# Patient Record
Sex: MALE | Race: WHITE | Employment: OTHER | URBAN - METROPOLITAN AREA
[De-identification: names, ages, dates, MRNs, and addresses within clinical notes are randomized per-mention and may not be internally consistent; named-entity substitution may affect disease eponyms.]

---

## 2017-03-10 PROBLEM — K21.9 GASTROESOPHAGEAL REFLUX DISEASE: Status: ACTIVE | Noted: 2017-03-10

## 2018-09-08 ENCOUNTER — HOSPITAL ENCOUNTER (OUTPATIENT)
Age: 78
Discharge: HOME | End: 2018-09-10
Payer: MEDICARE

## 2018-09-08 DIAGNOSIS — E55.9 VITAMIN D DEFICIENCY: ICD-10-CM

## 2018-09-08 DIAGNOSIS — E78.01 FAMILIAL HYPERCHOLESTEROLEMIA: ICD-10-CM

## 2018-09-08 DIAGNOSIS — D50.0 IRON DEFICIENCY ANEMIA SECONDARY TO BLOOD LOSS (CHRONIC): ICD-10-CM

## 2018-09-08 DIAGNOSIS — R73.9 HYPERGLYCEMIA: ICD-10-CM

## 2018-09-08 DIAGNOSIS — E03.9 PRIMARY HYPOTHYROIDISM: ICD-10-CM

## 2018-09-08 LAB
ALBUMIN SERPL-MCNC: 4.3 G/DL (ref 3.5–5.2)
ALP BLD-CCNC: 78 U/L (ref 40–129)
ALT SERPL-CCNC: 14 U/L (ref 0–40)
ANION GAP SERPL CALCULATED.3IONS-SCNC: 10 MMOL/L (ref 7–16)
AST SERPL-CCNC: 16 U/L (ref 0–39)
BILIRUB SERPL-MCNC: 0.5 MG/DL (ref 0–1.2)
BUN BLDV-MCNC: 14 MG/DL (ref 8–23)
CALCIUM SERPL-MCNC: 9.1 MG/DL (ref 8.6–10.2)
CHLORIDE BLD-SCNC: 105 MMOL/L (ref 98–107)
CHOLESTEROL, TOTAL: 170 MG/DL (ref 0–199)
CO2: 24 MMOL/L (ref 22–29)
CREAT SERPL-MCNC: 0.9 MG/DL (ref 0.7–1.2)
GFR AFRICAN AMERICAN: >60
GFR NON-AFRICAN AMERICAN: >60 ML/MIN/1.73
GLUCOSE BLD-MCNC: 97 MG/DL (ref 74–109)
HBA1C MFR BLD: 5.5 % (ref 4–5.6)
HCT VFR BLD CALC: 48.5 % (ref 37–54)
HDLC SERPL-MCNC: 52 MG/DL
HEMOGLOBIN: 15.7 G/DL (ref 12.5–16.5)
LDL CHOLESTEROL CALCULATED: 87 MG/DL (ref 0–99)
MCH RBC QN AUTO: 31.5 PG (ref 26–35)
MCHC RBC AUTO-ENTMCNC: 32.4 % (ref 32–34.5)
MCV RBC AUTO: 97.2 FL (ref 80–99.9)
PDW BLD-RTO: 13.1 FL (ref 11.5–15)
PLATELET # BLD: 169 E9/L (ref 130–450)
PMV BLD AUTO: 10.4 FL (ref 7–12)
POTASSIUM SERPL-SCNC: 4.3 MMOL/L (ref 3.5–5)
RBC # BLD: 4.99 E12/L (ref 3.8–5.8)
SODIUM BLD-SCNC: 139 MMOL/L (ref 132–146)
TOTAL PROTEIN: 7.4 G/DL (ref 6.4–8.3)
TRIGL SERPL-MCNC: 156 MG/DL (ref 0–149)
TSH SERPL DL<=0.05 MIU/L-ACNC: 0.64 UIU/ML (ref 0.27–4.2)
VITAMIN D 25-HYDROXY: 32 NG/ML (ref 30–100)
VLDLC SERPL CALC-MCNC: 31 MG/DL
WBC # BLD: 5.1 E9/L (ref 4.5–11.5)

## 2018-09-08 PROCEDURE — 82306 VITAMIN D 25 HYDROXY: CPT

## 2018-09-08 PROCEDURE — 85027 COMPLETE CBC AUTOMATED: CPT

## 2018-09-08 PROCEDURE — 80053 COMPREHEN METABOLIC PANEL: CPT

## 2018-09-08 PROCEDURE — 80061 LIPID PANEL: CPT

## 2018-09-08 PROCEDURE — 83036 HEMOGLOBIN GLYCOSYLATED A1C: CPT

## 2018-09-08 PROCEDURE — 84443 ASSAY THYROID STIM HORMONE: CPT

## 2019-09-12 ENCOUNTER — HOSPITAL ENCOUNTER (OUTPATIENT)
Age: 79
Discharge: HOME | End: 2019-09-14
Payer: MEDICARE

## 2019-09-12 DIAGNOSIS — E55.9 VITAMIN D DEFICIENCY: ICD-10-CM

## 2019-09-12 DIAGNOSIS — E78.01 FAMILIAL HYPERCHOLESTEROLEMIA: ICD-10-CM

## 2019-09-12 DIAGNOSIS — Z12.5 SPECIAL SCREENING FOR MALIGNANT NEOPLASM OF PROSTATE: ICD-10-CM

## 2019-09-12 DIAGNOSIS — E03.9 HYPOTHYROIDISM, UNSPECIFIED TYPE: ICD-10-CM

## 2019-09-12 DIAGNOSIS — R73.9 HYPERGLYCEMIA: ICD-10-CM

## 2019-09-12 DIAGNOSIS — D50.0 IRON DEFICIENCY ANEMIA SECONDARY TO BLOOD LOSS (CHRONIC): ICD-10-CM

## 2019-09-12 LAB
ALBUMIN SERPL-MCNC: 4.5 G/DL (ref 3.5–5.2)
ALP BLD-CCNC: 79 U/L (ref 40–129)
ALT SERPL-CCNC: 14 U/L (ref 0–40)
ANION GAP SERPL CALCULATED.3IONS-SCNC: 14 MMOL/L (ref 7–16)
AST SERPL-CCNC: 17 U/L (ref 0–39)
BILIRUB SERPL-MCNC: 0.5 MG/DL (ref 0–1.2)
BUN BLDV-MCNC: 14 MG/DL (ref 8–23)
CALCIUM SERPL-MCNC: 9.4 MG/DL (ref 8.6–10.2)
CHLORIDE BLD-SCNC: 102 MMOL/L (ref 98–107)
CHOLESTEROL, TOTAL: 194 MG/DL (ref 0–199)
CO2: 25 MMOL/L (ref 22–29)
CREAT SERPL-MCNC: 1 MG/DL (ref 0.7–1.2)
GFR AFRICAN AMERICAN: >60
GFR NON-AFRICAN AMERICAN: >60 ML/MIN/1.73
GLUCOSE BLD-MCNC: 101 MG/DL (ref 74–99)
HBA1C MFR BLD: 5.6 % (ref 4–5.6)
HCT VFR BLD CALC: 50.8 % (ref 37–54)
HDLC SERPL-MCNC: 52 MG/DL
HEMOGLOBIN: 16.3 G/DL (ref 12.5–16.5)
LDL CHOLESTEROL CALCULATED: 121 MG/DL (ref 0–99)
MCH RBC QN AUTO: 32.2 PG (ref 26–35)
MCHC RBC AUTO-ENTMCNC: 32.1 % (ref 32–34.5)
MCV RBC AUTO: 100.4 FL (ref 80–99.9)
PDW BLD-RTO: 13.2 FL (ref 11.5–15)
PLATELET # BLD: 178 E9/L (ref 130–450)
PMV BLD AUTO: 9.8 FL (ref 7–12)
POTASSIUM SERPL-SCNC: 4.1 MMOL/L (ref 3.5–5)
PROSTATE SPECIFIC ANTIGEN: 1.35 NG/ML (ref 0–4)
RBC # BLD: 5.06 E12/L (ref 3.8–5.8)
SODIUM BLD-SCNC: 141 MMOL/L (ref 132–146)
TOTAL PROTEIN: 7.7 G/DL (ref 6.4–8.3)
TRIGL SERPL-MCNC: 107 MG/DL (ref 0–149)
TSH SERPL DL<=0.05 MIU/L-ACNC: 1.36 UIU/ML (ref 0.27–4.2)
VLDLC SERPL CALC-MCNC: 21 MG/DL
WBC # BLD: 5 E9/L (ref 4.5–11.5)

## 2019-09-12 PROCEDURE — 80061 LIPID PANEL: CPT

## 2019-09-12 PROCEDURE — 84443 ASSAY THYROID STIM HORMONE: CPT

## 2019-09-12 PROCEDURE — G0103 PSA SCREENING: HCPCS

## 2019-09-12 PROCEDURE — 83036 HEMOGLOBIN GLYCOSYLATED A1C: CPT

## 2019-09-12 PROCEDURE — 36415 COLL VENOUS BLD VENIPUNCTURE: CPT

## 2019-09-12 PROCEDURE — 85027 COMPLETE CBC AUTOMATED: CPT

## 2019-09-12 PROCEDURE — 80053 COMPREHEN METABOLIC PANEL: CPT

## 2020-02-21 PROBLEM — F01.53 VASCULAR DEMENTIA WITH DEPRESSED MOOD: Status: ACTIVE | Noted: 2020-02-21

## 2020-03-16 ENCOUNTER — APPOINTMENT (OUTPATIENT)
Dept: CT IMAGING | Age: 80
End: 2020-03-16
Payer: MEDICARE

## 2020-03-16 ENCOUNTER — HOSPITAL ENCOUNTER (EMERGENCY)
Age: 80
Discharge: HOME | End: 2020-03-16
Attending: EMERGENCY MEDICINE
Payer: MEDICARE

## 2020-03-16 VITALS
WEIGHT: 205 LBS | BODY MASS INDEX: 31.07 KG/M2 | SYSTOLIC BLOOD PRESSURE: 93 MMHG | TEMPERATURE: 97.7 F | OXYGEN SATURATION: 96 % | HEIGHT: 68 IN | HEART RATE: 64 BPM | RESPIRATION RATE: 20 BRPM | DIASTOLIC BLOOD PRESSURE: 46 MMHG

## 2020-03-16 LAB
ALBUMIN SERPL-MCNC: 3.8 G/DL (ref 3.5–5.2)
ALP BLD-CCNC: 68 U/L (ref 40–129)
ALT SERPL-CCNC: 24 U/L (ref 0–40)
ANION GAP SERPL CALCULATED.3IONS-SCNC: 12 MMOL/L (ref 7–16)
AST SERPL-CCNC: 28 U/L (ref 0–39)
BACTERIA: ABNORMAL /HPF
BASOPHILS ABSOLUTE: 0 E9/L (ref 0–0.2)
BASOPHILS RELATIVE PERCENT: 0.1 % (ref 0–2)
BILIRUB SERPL-MCNC: 0.5 MG/DL (ref 0–1.2)
BILIRUBIN URINE: NEGATIVE
BLOOD, URINE: ABNORMAL
BUN BLDV-MCNC: 23 MG/DL (ref 8–23)
CALCIUM SERPL-MCNC: 8.6 MG/DL (ref 8.6–10.2)
CHLORIDE BLD-SCNC: 104 MMOL/L (ref 98–107)
CLARITY: CLEAR
CO2: 19 MMOL/L (ref 22–29)
COLOR: YELLOW
CREAT SERPL-MCNC: 1 MG/DL (ref 0.7–1.2)
CRYSTALS, UA: ABNORMAL /HPF
EOSINOPHILS ABSOLUTE: 0 E9/L (ref 0.05–0.5)
EOSINOPHILS RELATIVE PERCENT: 0.3 % (ref 0–6)
EPITHELIAL CELLS, UA: ABNORMAL /HPF
GFR AFRICAN AMERICAN: >60
GFR NON-AFRICAN AMERICAN: >60 ML/MIN/1.73
GLUCOSE BLD-MCNC: 110 MG/DL (ref 74–99)
GLUCOSE URINE: NEGATIVE MG/DL
HCT VFR BLD CALC: 49.8 % (ref 37–54)
HEMOGLOBIN: 16.7 G/DL (ref 12.5–16.5)
KETONES, URINE: NEGATIVE MG/DL
LACTIC ACID, SEPSIS: 1.1 MMOL/L (ref 0.5–1.9)
LEUKOCYTE ESTERASE, URINE: NEGATIVE
LIPASE: 24 U/L (ref 13–60)
LYMPHOCYTES ABSOLUTE: 0.54 E9/L (ref 1.5–4)
LYMPHOCYTES RELATIVE PERCENT: 8 % (ref 20–42)
MCH RBC QN AUTO: 32.9 PG (ref 26–35)
MCHC RBC AUTO-ENTMCNC: 33.5 % (ref 32–34.5)
MCV RBC AUTO: 98.2 FL (ref 80–99.9)
MONOCYTES ABSOLUTE: 0.2 E9/L (ref 0.1–0.95)
MONOCYTES RELATIVE PERCENT: 2.7 % (ref 2–12)
MUCUS: PRESENT /LPF
NEUTROPHILS ABSOLUTE: 6.05 E9/L (ref 1.8–7.3)
NEUTROPHILS RELATIVE PERCENT: 89.4 % (ref 43–80)
NITRITE, URINE: NEGATIVE
OVALOCYTES: ABNORMAL
PDW BLD-RTO: 13.2 FL (ref 11.5–15)
PH UA: 5 (ref 5–9)
PLATELET # BLD: 149 E9/L (ref 130–450)
PMV BLD AUTO: 9.5 FL (ref 7–12)
POIKILOCYTES: ABNORMAL
POTASSIUM SERPL-SCNC: 4.2 MMOL/L (ref 3.5–5)
PROTEIN UA: NEGATIVE MG/DL
RBC # BLD: 5.07 E12/L (ref 3.8–5.8)
RBC UA: ABNORMAL /HPF (ref 0–2)
SODIUM BLD-SCNC: 135 MMOL/L (ref 132–146)
SPECIFIC GRAVITY UA: 1.01 (ref 1–1.03)
TOTAL PROTEIN: 6.8 G/DL (ref 6.4–8.3)
UROBILINOGEN, URINE: 0.2 E.U./DL
WBC # BLD: 6.8 E9/L (ref 4.5–11.5)
WBC UA: ABNORMAL /HPF (ref 0–5)

## 2020-03-16 PROCEDURE — 6360000004 HC RX CONTRAST MEDICATION: Performed by: RADIOLOGY

## 2020-03-16 PROCEDURE — 2580000003 HC RX 258: Performed by: STUDENT IN AN ORGANIZED HEALTH CARE EDUCATION/TRAINING PROGRAM

## 2020-03-16 PROCEDURE — 6360000002 HC RX W HCPCS: Performed by: STUDENT IN AN ORGANIZED HEALTH CARE EDUCATION/TRAINING PROGRAM

## 2020-03-16 PROCEDURE — 96374 THER/PROPH/DIAG INJ IV PUSH: CPT

## 2020-03-16 PROCEDURE — 85025 COMPLETE CBC W/AUTO DIFF WBC: CPT

## 2020-03-16 PROCEDURE — 81001 URINALYSIS AUTO W/SCOPE: CPT

## 2020-03-16 PROCEDURE — 80053 COMPREHEN METABOLIC PANEL: CPT

## 2020-03-16 PROCEDURE — 83605 ASSAY OF LACTIC ACID: CPT

## 2020-03-16 PROCEDURE — 74177 CT ABD & PELVIS W/CONTRAST: CPT

## 2020-03-16 PROCEDURE — 99284 EMERGENCY DEPT VISIT MOD MDM: CPT

## 2020-03-16 PROCEDURE — 83690 ASSAY OF LIPASE: CPT

## 2020-03-16 RX ORDER — 0.9 % SODIUM CHLORIDE 0.9 %
1000 INTRAVENOUS SOLUTION INTRAVENOUS ONCE
Status: COMPLETED | OUTPATIENT
Start: 2020-03-16 | End: 2020-03-16

## 2020-03-16 RX ORDER — CALCIUM CARBONATE 200(500)MG
2 TABLET,CHEWABLE ORAL 4 TIMES DAILY PRN
COMMUNITY
End: 2020-11-20

## 2020-03-16 RX ORDER — LEVOTHYROXINE SODIUM 0.15 MG/1
150 TABLET ORAL DAILY
COMMUNITY
End: 2020-03-23 | Stop reason: SDUPTHER

## 2020-03-16 RX ORDER — DILTIAZEM HYDROCHLORIDE 120 MG/1
120 CAPSULE, COATED, EXTENDED RELEASE ORAL DAILY
COMMUNITY
End: 2020-05-26 | Stop reason: SDUPTHER

## 2020-03-16 RX ORDER — ONDANSETRON 2 MG/ML
4 INJECTION INTRAMUSCULAR; INTRAVENOUS ONCE
Status: COMPLETED | OUTPATIENT
Start: 2020-03-16 | End: 2020-03-16

## 2020-03-16 RX ORDER — VERAPAMIL HYDROCHLORIDE 120 MG/1
120 TABLET, FILM COATED ORAL 3 TIMES DAILY
COMMUNITY
End: 2020-03-16 | Stop reason: ALTCHOICE

## 2020-03-16 RX ADMIN — ONDANSETRON 4 MG: 2 INJECTION INTRAMUSCULAR; INTRAVENOUS at 10:37

## 2020-03-16 RX ADMIN — IOPAMIDOL 80 ML: 755 INJECTION, SOLUTION INTRAVENOUS at 11:43

## 2020-03-16 RX ADMIN — SODIUM CHLORIDE 1000 ML: 9 INJECTION, SOLUTION INTRAVENOUS at 10:37

## 2020-03-16 RX ADMIN — SODIUM CHLORIDE 1000 ML: 9 INJECTION, SOLUTION INTRAVENOUS at 13:45

## 2020-03-16 ASSESSMENT — ENCOUNTER SYMPTOMS
SHORTNESS OF BREATH: 0
BACK PAIN: 0
DIARRHEA: 1
EYE REDNESS: 0
COUGH: 0
SORE THROAT: 0
EYE DISCHARGE: 0
VOMITING: 0
NAUSEA: 1
SINUS PRESSURE: 0
ABDOMINAL PAIN: 1
WHEEZING: 0
EYE PAIN: 0

## 2020-03-16 NOTE — ED PROVIDER NOTES
The patient is a 70-year-old male who presents the emergency department valuated for abdominal pain and diarrhea that is been ongoing for the past 4 to 5 days. Patient states that 3 weeks ago he was on an antibiotic after a insect bite to his right hand. He does not remember what antibiotic he was on. He denies any fevers or chills. He states the diarrhea began some brown color but is developed into a watery, clear, malodorous diarrhea. He still eating and drinking normally without vomiting. There is intermittent nausea. He has no pain at this time. The pain is described as a dull, aching sensation in the lower abdomen that occasionally radiates into the right upper abdomen and right flank. He denies any hematuria or dysuria. There is no migration of the pain into the groin of the legs. He has no chest pain or shortness of breath. He is not taking any medications at home for the symptoms. The history is provided by the patient. Review of Systems   Constitutional: Negative for chills and fever. HENT: Negative for ear pain, sinus pressure and sore throat. Eyes: Negative for pain, discharge and redness. Respiratory: Negative for cough, shortness of breath and wheezing. Cardiovascular: Negative for chest pain. Gastrointestinal: Positive for abdominal pain, diarrhea and nausea. Negative for vomiting. Genitourinary: Negative for dysuria and frequency. Musculoskeletal: Negative for arthralgias and back pain. Skin: Negative for rash and wound. Neurological: Negative for weakness and headaches. Hematological: Negative for adenopathy. All other systems reviewed and are negative. Physical Exam  Vitals signs and nursing note reviewed. Constitutional:       General: He is not in acute distress. Appearance: Normal appearance. He is normal weight. He is not ill-appearing, toxic-appearing or diaphoretic. HENT:      Mouth/Throat:      Mouth: Mucous membranes are dry. Cardiovascular:      Rate and Rhythm: Normal rate and regular rhythm. Pulses: Normal pulses. Heart sounds: Normal heart sounds. No murmur. Pulmonary:      Effort: Pulmonary effort is normal. No respiratory distress. Breath sounds: Normal breath sounds. No wheezing, rhonchi or rales. Abdominal:      General: Abdomen is flat. Palpations: Abdomen is soft. Tenderness: There is no abdominal tenderness. Negative signs include Alvarado's sign and Rovsing's sign. Hernia: No hernia is present. Neurological:      General: No focal deficit present. Mental Status: He is alert and oriented to person, place, and time. Motor: No weakness. Coordination: Coordination normal.          Procedures     MDM  Number of Diagnoses or Management Options  Diarrhea, unspecified type:   Diagnosis management comments: Benign abdominal examination. No evidence of an acute infectious or inflammatory process on the CT abdomen pelvis. No evidence of vascular abnormality. Supportive care provided here in the department. Patient discharged home in stable condition. Return instructions were discussed at length. ED Course as of Mar 16 1430   Mon Mar 16, 2020   1337   ATTENDING PROVIDER ATTESTATION:     I have personally performed and/or participated in the history, exam, medical decision making, and procedures and agree with all pertinent clinical information unless otherwise noted. I have also reviewed and agree with the past medical, family and social history unless otherwise noted. I have discussed this patient in detail with the resident and provided the instruction and education regarding the evidence-based evaluation and treatment of [unfilled]  History: patient presents with complaint of diarrhea. My findings: Meron Berumen is a 78 y.o. male whom is in no distress. Physical exam reveals well appearing male. Dry mucous membranes.  Abdomen with (+) BS, soft and Endoscopy, colon, diagnostic; ECHO Compl W Dop Color Flow (3/18/2013); Colonoscopy (12/13/2012); and eye surgery. Social History:  reports that he has never smoked. He has never used smokeless tobacco. He reports that he does not drink alcohol or use drugs. Family History: family history includes Diabetes in his mother; Hearing Loss in his brother. The patients home medications have been reviewed. Allergies: Patient has no known allergies.     -------------------------------------------------- RESULTS -------------------------------------------------  Labs:  Results for orders placed or performed during the hospital encounter of 03/16/20   CBC auto differential   Result Value Ref Range    WBC 6.8 4.5 - 11.5 E9/L    RBC 5.07 3.80 - 5.80 E12/L    Hemoglobin 16.7 (H) 12.5 - 16.5 g/dL    Hematocrit 49.8 37.0 - 54.0 %    MCV 98.2 80.0 - 99.9 fL    MCH 32.9 26.0 - 35.0 pg    MCHC 33.5 32.0 - 34.5 %    RDW 13.2 11.5 - 15.0 fL    Platelets 312 635 - 646 E9/L    MPV 9.5 7.0 - 12.0 fL    Neutrophils % 89.4 (H) 43.0 - 80.0 %    Lymphocytes % 8.0 (L) 20.0 - 42.0 %    Monocytes % 2.7 2.0 - 12.0 %    Eosinophils % 0.3 0.0 - 6.0 %    Basophils % 0.1 0.0 - 2.0 %    Neutrophils Absolute 6.05 1.80 - 7.30 E9/L    Lymphocytes Absolute 0.54 (L) 1.50 - 4.00 E9/L    Monocytes Absolute 0.20 0.10 - 0.95 E9/L    Eosinophils Absolute 0.00 (L) 0.05 - 0.50 E9/L    Basophils Absolute 0.00 0.00 - 0.20 E9/L    Poikilocytes 1+     Ovalocytes 1+    Comprehensive Metabolic Panel   Result Value Ref Range    Sodium 135 132 - 146 mmol/L    Potassium 4.2 3.5 - 5.0 mmol/L    Chloride 104 98 - 107 mmol/L    CO2 19 (L) 22 - 29 mmol/L    Anion Gap 12 7 - 16 mmol/L    Glucose 110 (H) 74 - 99 mg/dL    BUN 23 8 - 23 mg/dL    CREATININE 1.0 0.7 - 1.2 mg/dL    GFR Non-African American >60 >=60 mL/min/1.73    GFR African American >60     Calcium 8.6 8.6 - 10.2 mg/dL    Total Protein 6.8 6.4 - 8.3 g/dL    Alb 3.8 3.5 - 5.2 g/dL    Total Bilirubin 0.5

## 2020-11-11 DIAGNOSIS — Z12.5 SPECIAL SCREENING FOR MALIGNANT NEOPLASM OF PROSTATE: ICD-10-CM

## 2020-11-11 DIAGNOSIS — D50.0 IRON DEFICIENCY ANEMIA SECONDARY TO BLOOD LOSS (CHRONIC): ICD-10-CM

## 2020-11-11 DIAGNOSIS — E55.9 VITAMIN D DEFICIENCY: ICD-10-CM

## 2020-11-11 DIAGNOSIS — E03.9 HYPOTHYROIDISM, UNSPECIFIED TYPE: ICD-10-CM

## 2020-11-11 DIAGNOSIS — R73.9 HYPERGLYCEMIA: ICD-10-CM

## 2020-11-11 DIAGNOSIS — E78.01 FAMILIAL HYPERCHOLESTEROLEMIA: ICD-10-CM

## 2020-11-11 LAB
ALBUMIN SERPL-MCNC: 4.4 G/DL (ref 3.5–5.2)
ALP BLD-CCNC: 77 U/L (ref 40–129)
ALT SERPL-CCNC: 14 U/L (ref 0–40)
ANION GAP SERPL CALCULATED.3IONS-SCNC: 15 MMOL/L (ref 7–16)
AST SERPL-CCNC: 18 U/L (ref 0–39)
BILIRUB SERPL-MCNC: 0.4 MG/DL (ref 0–1.2)
BUN BLDV-MCNC: 15 MG/DL (ref 8–23)
CALCIUM SERPL-MCNC: 9.3 MG/DL (ref 8.6–10.2)
CHLORIDE BLD-SCNC: 104 MMOL/L (ref 98–107)
CHOLESTEROL, TOTAL: 176 MG/DL (ref 0–199)
CO2: 21 MMOL/L (ref 22–29)
CREAT SERPL-MCNC: 0.9 MG/DL (ref 0.7–1.2)
GFR AFRICAN AMERICAN: >60
GFR NON-AFRICAN AMERICAN: >60 ML/MIN/1.73
GLUCOSE BLD-MCNC: 105 MG/DL (ref 74–99)
HBA1C MFR BLD: 5.6 % (ref 4–5.6)
HCT VFR BLD CALC: 51.2 % (ref 37–54)
HDLC SERPL-MCNC: 49 MG/DL
HEMOGLOBIN: 16.4 G/DL (ref 12.5–16.5)
LDL CHOLESTEROL CALCULATED: 106 MG/DL (ref 0–99)
MCH RBC QN AUTO: 31.5 PG (ref 26–35)
MCHC RBC AUTO-ENTMCNC: 32 % (ref 32–34.5)
MCV RBC AUTO: 98.5 FL (ref 80–99.9)
PDW BLD-RTO: 13 FL (ref 11.5–15)
PLATELET # BLD: 194 E9/L (ref 130–450)
PMV BLD AUTO: 10.3 FL (ref 7–12)
POTASSIUM SERPL-SCNC: 4.2 MMOL/L (ref 3.5–5)
PROSTATE SPECIFIC ANTIGEN: 1.34 NG/ML (ref 0–4)
RBC # BLD: 5.2 E12/L (ref 3.8–5.8)
SODIUM BLD-SCNC: 140 MMOL/L (ref 132–146)
TOTAL PROTEIN: 7.4 G/DL (ref 6.4–8.3)
TRIGL SERPL-MCNC: 105 MG/DL (ref 0–149)
TSH SERPL DL<=0.05 MIU/L-ACNC: 1.61 UIU/ML (ref 0.27–4.2)
VITAMIN D 25-HYDROXY: 40 NG/ML (ref 30–100)
VLDLC SERPL CALC-MCNC: 21 MG/DL
WBC # BLD: 5.8 E9/L (ref 4.5–11.5)

## 2021-11-22 DIAGNOSIS — E03.9 PRIMARY HYPOTHYROIDISM: ICD-10-CM

## 2021-11-22 DIAGNOSIS — D50.0 IRON DEFICIENCY ANEMIA SECONDARY TO BLOOD LOSS (CHRONIC): ICD-10-CM

## 2021-11-22 DIAGNOSIS — Z00.00 WELL ADULT EXAM: ICD-10-CM

## 2021-11-22 DIAGNOSIS — E78.01 FAMILIAL HYPERCHOLESTEROLEMIA: ICD-10-CM

## 2021-11-22 DIAGNOSIS — R73.9 HYPERGLYCEMIA: ICD-10-CM

## 2021-11-22 PROBLEM — F01.53 VASCULAR DEMENTIA WITH DEPRESSED MOOD: Status: RESOLVED | Noted: 2020-02-21 | Resolved: 2021-11-22

## 2021-11-22 LAB
ALBUMIN SERPL-MCNC: 4.3 G/DL (ref 3.5–5.2)
ALP BLD-CCNC: 81 U/L (ref 40–129)
ALT SERPL-CCNC: 14 U/L (ref 0–40)
ANION GAP SERPL CALCULATED.3IONS-SCNC: 14 MMOL/L (ref 7–16)
AST SERPL-CCNC: 16 U/L (ref 0–39)
BILIRUB SERPL-MCNC: 0.5 MG/DL (ref 0–1.2)
BUN BLDV-MCNC: 11 MG/DL (ref 6–23)
CALCIUM SERPL-MCNC: 9.1 MG/DL (ref 8.6–10.2)
CHLORIDE BLD-SCNC: 102 MMOL/L (ref 98–107)
CHOLESTEROL, TOTAL: 190 MG/DL (ref 0–199)
CO2: 24 MMOL/L (ref 22–29)
CREAT SERPL-MCNC: 0.8 MG/DL (ref 0.7–1.2)
GFR AFRICAN AMERICAN: >60
GFR NON-AFRICAN AMERICAN: >60 ML/MIN/1.73
GLUCOSE BLD-MCNC: 112 MG/DL (ref 74–99)
HBA1C MFR BLD: 5.4 % (ref 4–5.6)
HCT VFR BLD CALC: 50.6 % (ref 37–54)
HDLC SERPL-MCNC: 54 MG/DL
HEMOGLOBIN: 16.5 G/DL (ref 12.5–16.5)
LDL CHOLESTEROL CALCULATED: 115 MG/DL (ref 0–99)
MCH RBC QN AUTO: 32.5 PG (ref 26–35)
MCHC RBC AUTO-ENTMCNC: 32.6 % (ref 32–34.5)
MCV RBC AUTO: 99.6 FL (ref 80–99.9)
PDW BLD-RTO: 12.7 FL (ref 11.5–15)
PLATELET # BLD: 194 E9/L (ref 130–450)
PMV BLD AUTO: 9.8 FL (ref 7–12)
POTASSIUM SERPL-SCNC: 4.1 MMOL/L (ref 3.5–5)
RBC # BLD: 5.08 E12/L (ref 3.8–5.8)
SODIUM BLD-SCNC: 140 MMOL/L (ref 132–146)
TOTAL PROTEIN: 7.3 G/DL (ref 6.4–8.3)
TRIGL SERPL-MCNC: 107 MG/DL (ref 0–149)
TSH SERPL DL<=0.05 MIU/L-ACNC: 2.11 UIU/ML (ref 0.27–4.2)
VLDLC SERPL CALC-MCNC: 21 MG/DL
WBC # BLD: 5 E9/L (ref 4.5–11.5)

## 2022-02-26 ENCOUNTER — APPOINTMENT (OUTPATIENT)
Dept: GENERAL RADIOLOGY | Age: 82
DRG: 388 | End: 2022-02-26
Payer: MEDICARE

## 2022-02-26 ENCOUNTER — APPOINTMENT (OUTPATIENT)
Dept: CT IMAGING | Age: 82
DRG: 388 | End: 2022-02-26
Payer: MEDICARE

## 2022-02-26 ENCOUNTER — HOSPITAL ENCOUNTER (INPATIENT)
Age: 82
LOS: 3 days | Discharge: HOME | DRG: 388 | End: 2022-03-01
Attending: EMERGENCY MEDICINE | Admitting: INTERNAL MEDICINE
Payer: MEDICARE

## 2022-02-26 DIAGNOSIS — R07.9 CHEST PAIN, UNSPECIFIED TYPE: ICD-10-CM

## 2022-02-26 DIAGNOSIS — K56.609 SMALL BOWEL OBSTRUCTION (HCC): ICD-10-CM

## 2022-02-26 DIAGNOSIS — R55 SYNCOPE AND COLLAPSE: ICD-10-CM

## 2022-02-26 DIAGNOSIS — R10.84 GENERALIZED ABDOMINAL PAIN: Primary | ICD-10-CM

## 2022-02-26 LAB
ALBUMIN SERPL-MCNC: 4.3 G/DL (ref 3.5–5.2)
ALP BLD-CCNC: 85 U/L (ref 40–129)
ALT SERPL-CCNC: 17 U/L (ref 0–40)
ANION GAP SERPL CALCULATED.3IONS-SCNC: 14 MMOL/L (ref 7–16)
AST SERPL-CCNC: 20 U/L (ref 0–39)
BACTERIA: ABNORMAL /HPF
BASOPHILS ABSOLUTE: 0.01 E9/L (ref 0–0.2)
BASOPHILS RELATIVE PERCENT: 0.1 % (ref 0–2)
BILIRUB SERPL-MCNC: 0.7 MG/DL (ref 0–1.2)
BILIRUBIN DIRECT: <0.2 MG/DL (ref 0–0.3)
BILIRUBIN URINE: NEGATIVE
BILIRUBIN, INDIRECT: NORMAL MG/DL (ref 0–1)
BLOOD, URINE: NEGATIVE
BUN BLDV-MCNC: 22 MG/DL (ref 6–23)
CALCIUM SERPL-MCNC: 9.9 MG/DL (ref 8.6–10.2)
CHLORIDE BLD-SCNC: 94 MMOL/L (ref 98–107)
CLARITY: ABNORMAL
CO2: 25 MMOL/L (ref 22–29)
COLOR: ABNORMAL
CREAT SERPL-MCNC: 1.1 MG/DL (ref 0.7–1.2)
CRYSTALS, UA: ABNORMAL /HPF
EOSINOPHILS ABSOLUTE: 0.03 E9/L (ref 0.05–0.5)
EOSINOPHILS RELATIVE PERCENT: 0.3 % (ref 0–6)
EPITHELIAL CELLS, UA: ABNORMAL /HPF
GFR AFRICAN AMERICAN: >60
GFR NON-AFRICAN AMERICAN: >60 ML/MIN/1.73
GLUCOSE BLD-MCNC: 142 MG/DL (ref 74–99)
GLUCOSE URINE: NEGATIVE MG/DL
HCT VFR BLD CALC: 53.6 % (ref 37–54)
HEMOGLOBIN: 17.6 G/DL (ref 12.5–16.5)
IMMATURE GRANULOCYTES #: 0.02 E9/L
IMMATURE GRANULOCYTES %: 0.2 % (ref 0–5)
KETONES, URINE: 15 MG/DL
LEUKOCYTE ESTERASE, URINE: NEGATIVE
LIPASE: 11 U/L (ref 13–60)
LYMPHOCYTES ABSOLUTE: 0.79 E9/L (ref 1.5–4)
LYMPHOCYTES RELATIVE PERCENT: 9.1 % (ref 20–42)
MCH RBC QN AUTO: 32.1 PG (ref 26–35)
MCHC RBC AUTO-ENTMCNC: 32.8 % (ref 32–34.5)
MCV RBC AUTO: 97.8 FL (ref 80–99.9)
MONOCYTES ABSOLUTE: 0.63 E9/L (ref 0.1–0.95)
MONOCYTES RELATIVE PERCENT: 7.2 % (ref 2–12)
NEUTROPHILS ABSOLUTE: 7.22 E9/L (ref 1.8–7.3)
NEUTROPHILS RELATIVE PERCENT: 83.1 % (ref 43–80)
NITRITE, URINE: NEGATIVE
PDW BLD-RTO: 13 FL (ref 11.5–15)
PH UA: 5.5 (ref 5–9)
PLATELET # BLD: 182 E9/L (ref 130–450)
PMV BLD AUTO: 9.4 FL (ref 7–12)
POTASSIUM REFLEX MAGNESIUM: 3.9 MMOL/L (ref 3.5–5)
PRO-BNP: 67 PG/ML (ref 0–450)
PROTEIN UA: ABNORMAL MG/DL
RBC # BLD: 5.48 E12/L (ref 3.8–5.8)
RBC UA: ABNORMAL /HPF (ref 0–2)
SODIUM BLD-SCNC: 133 MMOL/L (ref 132–146)
SPECIFIC GRAVITY UA: 1.02 (ref 1–1.03)
TOTAL PROTEIN: 7.4 G/DL (ref 6.4–8.3)
TROPONIN, HIGH SENSITIVITY: 12 NG/L (ref 0–11)
TROPONIN, HIGH SENSITIVITY: 12 NG/L (ref 0–11)
UROBILINOGEN, URINE: 0.2 E.U./DL
WBC # BLD: 8.7 E9/L (ref 4.5–11.5)
WBC UA: ABNORMAL /HPF (ref 0–5)

## 2022-02-26 PROCEDURE — 93005 ELECTROCARDIOGRAM TRACING: CPT | Performed by: STUDENT IN AN ORGANIZED HEALTH CARE EDUCATION/TRAINING PROGRAM

## 2022-02-26 PROCEDURE — 71045 X-RAY EXAM CHEST 1 VIEW: CPT

## 2022-02-26 PROCEDURE — 74177 CT ABD & PELVIS W/CONTRAST: CPT

## 2022-02-26 PROCEDURE — 84484 ASSAY OF TROPONIN QUANT: CPT

## 2022-02-26 PROCEDURE — 87088 URINE BACTERIA CULTURE: CPT

## 2022-02-26 PROCEDURE — 83880 ASSAY OF NATRIURETIC PEPTIDE: CPT

## 2022-02-26 PROCEDURE — 80048 BASIC METABOLIC PNL TOTAL CA: CPT

## 2022-02-26 PROCEDURE — 85025 COMPLETE CBC W/AUTO DIFF WBC: CPT

## 2022-02-26 PROCEDURE — 36415 COLL VENOUS BLD VENIPUNCTURE: CPT

## 2022-02-26 PROCEDURE — 6360000002 HC RX W HCPCS: Performed by: STUDENT IN AN ORGANIZED HEALTH CARE EDUCATION/TRAINING PROGRAM

## 2022-02-26 PROCEDURE — 96374 THER/PROPH/DIAG INJ IV PUSH: CPT

## 2022-02-26 PROCEDURE — 99285 EMERGENCY DEPT VISIT HI MDM: CPT

## 2022-02-26 PROCEDURE — 81001 URINALYSIS AUTO W/SCOPE: CPT

## 2022-02-26 PROCEDURE — 83690 ASSAY OF LIPASE: CPT

## 2022-02-26 PROCEDURE — 6360000004 HC RX CONTRAST MEDICATION: Performed by: RADIOLOGY

## 2022-02-26 PROCEDURE — 6370000000 HC RX 637 (ALT 250 FOR IP): Performed by: STUDENT IN AN ORGANIZED HEALTH CARE EDUCATION/TRAINING PROGRAM

## 2022-02-26 PROCEDURE — 80076 HEPATIC FUNCTION PANEL: CPT

## 2022-02-26 PROCEDURE — 1200000000 HC SEMI PRIVATE

## 2022-02-26 PROCEDURE — 6370000000 HC RX 637 (ALT 250 FOR IP): Performed by: EMERGENCY MEDICINE

## 2022-02-26 RX ORDER — LIDOCAINE HYDROCHLORIDE 20 MG/ML
JELLY TOPICAL ONCE
Status: COMPLETED | OUTPATIENT
Start: 2022-02-26 | End: 2022-02-26

## 2022-02-26 RX ORDER — OXYMETAZOLINE HYDROCHLORIDE 0.05 G/100ML
2 SPRAY NASAL ONCE
Status: COMPLETED | OUTPATIENT
Start: 2022-02-26 | End: 2022-02-26

## 2022-02-26 RX ORDER — ONDANSETRON 2 MG/ML
4 INJECTION INTRAMUSCULAR; INTRAVENOUS ONCE
Status: COMPLETED | OUTPATIENT
Start: 2022-02-26 | End: 2022-02-26

## 2022-02-26 RX ADMIN — LIDOCAINE HYDROCHLORIDE: 20 JELLY TOPICAL at 23:40

## 2022-02-26 RX ADMIN — LIDOCAINE HYDROCHLORIDE: 20 SOLUTION ORAL; TOPICAL at 22:27

## 2022-02-26 RX ADMIN — ONDANSETRON 4 MG: 2 INJECTION INTRAMUSCULAR; INTRAVENOUS at 21:39

## 2022-02-26 RX ADMIN — IOPAMIDOL 75 ML: 755 INJECTION, SOLUTION INTRAVENOUS at 22:48

## 2022-02-26 RX ADMIN — NASAL DECONGESTANT 2 SPRAY: 0.05 SPRAY NASAL at 23:39

## 2022-02-26 ASSESSMENT — PAIN SCALES - GENERAL: PAINLEVEL_OUTOF10: 8

## 2022-02-26 ASSESSMENT — ENCOUNTER SYMPTOMS
ABDOMINAL PAIN: 1
WHEEZING: 0
EYE PAIN: 0
VOMITING: 0
DIARRHEA: 0
EYE DISCHARGE: 0
NAUSEA: 1
SINUS PRESSURE: 0
SORE THROAT: 0
BACK PAIN: 0
SHORTNESS OF BREATH: 0
COUGH: 0

## 2022-02-26 ASSESSMENT — PAIN DESCRIPTION - PAIN TYPE: TYPE: ACUTE PAIN

## 2022-02-26 ASSESSMENT — PAIN DESCRIPTION - LOCATION: LOCATION: ABDOMEN

## 2022-02-26 ASSESSMENT — PAIN - FUNCTIONAL ASSESSMENT: PAIN_FUNCTIONAL_ASSESSMENT: 0-10

## 2022-02-27 ENCOUNTER — APPOINTMENT (OUTPATIENT)
Dept: GENERAL RADIOLOGY | Age: 82
DRG: 388 | End: 2022-02-27
Payer: MEDICARE

## 2022-02-27 LAB
ALBUMIN SERPL-MCNC: 3.9 G/DL (ref 3.5–5.2)
ALP BLD-CCNC: 76 U/L (ref 40–129)
ALT SERPL-CCNC: 19 U/L (ref 0–40)
ANION GAP SERPL CALCULATED.3IONS-SCNC: 11 MMOL/L (ref 7–16)
AST SERPL-CCNC: 21 U/L (ref 0–39)
BASOPHILS ABSOLUTE: 0.03 E9/L (ref 0–0.2)
BASOPHILS RELATIVE PERCENT: 0.5 % (ref 0–2)
BILIRUB SERPL-MCNC: 0.6 MG/DL (ref 0–1.2)
BUN BLDV-MCNC: 21 MG/DL (ref 6–23)
CALCIUM SERPL-MCNC: 8.9 MG/DL (ref 8.6–10.2)
CHLORIDE BLD-SCNC: 103 MMOL/L (ref 98–107)
CO2: 23 MMOL/L (ref 22–29)
CORTISOL TOTAL: 14.15 MCG/DL (ref 2.68–18.4)
CREAT SERPL-MCNC: 0.9 MG/DL (ref 0.7–1.2)
EOSINOPHILS ABSOLUTE: 0.12 E9/L (ref 0.05–0.5)
EOSINOPHILS RELATIVE PERCENT: 1.9 % (ref 0–6)
GFR AFRICAN AMERICAN: >60
GFR NON-AFRICAN AMERICAN: >60 ML/MIN/1.73
GLUCOSE BLD-MCNC: 109 MG/DL (ref 74–99)
HCT VFR BLD CALC: 50.6 % (ref 37–54)
HEMOGLOBIN: 16.5 G/DL (ref 12.5–16.5)
IMMATURE GRANULOCYTES #: 0.01 E9/L
IMMATURE GRANULOCYTES %: 0.2 % (ref 0–5)
LYMPHOCYTES ABSOLUTE: 1.07 E9/L (ref 1.5–4)
LYMPHOCYTES RELATIVE PERCENT: 16.7 % (ref 20–42)
MAGNESIUM: 2 MG/DL (ref 1.6–2.6)
MCH RBC QN AUTO: 32 PG (ref 26–35)
MCHC RBC AUTO-ENTMCNC: 32.6 % (ref 32–34.5)
MCV RBC AUTO: 98.3 FL (ref 80–99.9)
METER GLUCOSE: 105 MG/DL (ref 74–99)
METER GLUCOSE: 106 MG/DL (ref 74–99)
METER GLUCOSE: 107 MG/DL (ref 74–99)
MONOCYTES ABSOLUTE: 0.61 E9/L (ref 0.1–0.95)
MONOCYTES RELATIVE PERCENT: 9.5 % (ref 2–12)
NEUTROPHILS ABSOLUTE: 4.58 E9/L (ref 1.8–7.3)
NEUTROPHILS RELATIVE PERCENT: 71.2 % (ref 43–80)
PDW BLD-RTO: 13 FL (ref 11.5–15)
PHOSPHORUS: 3.5 MG/DL (ref 2.5–4.5)
PLATELET # BLD: 167 E9/L (ref 130–450)
PMV BLD AUTO: 10.1 FL (ref 7–12)
POTASSIUM SERPL-SCNC: 3.8 MMOL/L (ref 3.5–5)
PROCALCITONIN: 0.1 NG/ML (ref 0–0.08)
RBC # BLD: 5.15 E12/L (ref 3.8–5.8)
SODIUM BLD-SCNC: 137 MMOL/L (ref 132–146)
T4 FREE: 1.06 NG/DL (ref 0.93–1.7)
TOTAL PROTEIN: 6.6 G/DL (ref 6.4–8.3)
TSH SERPL DL<=0.05 MIU/L-ACNC: 1.54 UIU/ML (ref 0.27–4.2)
WBC # BLD: 6.4 E9/L (ref 4.5–11.5)

## 2022-02-27 PROCEDURE — 84100 ASSAY OF PHOSPHORUS: CPT

## 2022-02-27 PROCEDURE — 82533 TOTAL CORTISOL: CPT

## 2022-02-27 PROCEDURE — 6360000002 HC RX W HCPCS: Performed by: INTERNAL MEDICINE

## 2022-02-27 PROCEDURE — 85025 COMPLETE CBC W/AUTO DIFF WBC: CPT

## 2022-02-27 PROCEDURE — 84443 ASSAY THYROID STIM HORMONE: CPT

## 2022-02-27 PROCEDURE — 82962 GLUCOSE BLOOD TEST: CPT

## 2022-02-27 PROCEDURE — C9113 INJ PANTOPRAZOLE SODIUM, VIA: HCPCS | Performed by: INTERNAL MEDICINE

## 2022-02-27 PROCEDURE — 71045 X-RAY EXAM CHEST 1 VIEW: CPT

## 2022-02-27 PROCEDURE — 83735 ASSAY OF MAGNESIUM: CPT

## 2022-02-27 PROCEDURE — 0D9670Z DRAINAGE OF STOMACH WITH DRAINAGE DEVICE, VIA NATURAL OR ARTIFICIAL OPENING: ICD-10-PCS | Performed by: INTERNAL MEDICINE

## 2022-02-27 PROCEDURE — 1200000000 HC SEMI PRIVATE

## 2022-02-27 PROCEDURE — 84439 ASSAY OF FREE THYROXINE: CPT

## 2022-02-27 PROCEDURE — 99222 1ST HOSP IP/OBS MODERATE 55: CPT | Performed by: SURGERY

## 2022-02-27 PROCEDURE — 84145 PROCALCITONIN (PCT): CPT

## 2022-02-27 PROCEDURE — 80053 COMPREHEN METABOLIC PANEL: CPT

## 2022-02-27 PROCEDURE — 2580000003 HC RX 258: Performed by: INTERNAL MEDICINE

## 2022-02-27 PROCEDURE — 6370000000 HC RX 637 (ALT 250 FOR IP): Performed by: INTERNAL MEDICINE

## 2022-02-27 PROCEDURE — 36415 COLL VENOUS BLD VENIPUNCTURE: CPT

## 2022-02-27 PROCEDURE — 99222 1ST HOSP IP/OBS MODERATE 55: CPT | Performed by: INTERNAL MEDICINE

## 2022-02-27 RX ORDER — POLYETHYLENE GLYCOL 3350 17 G/17G
17 POWDER, FOR SOLUTION ORAL DAILY PRN
Status: DISCONTINUED | OUTPATIENT
Start: 2022-02-27 | End: 2022-03-01 | Stop reason: HOSPADM

## 2022-02-27 RX ORDER — TAMSULOSIN HYDROCHLORIDE 0.4 MG/1
0.4 CAPSULE ORAL DAILY
Status: DISCONTINUED | OUTPATIENT
Start: 2022-02-28 | End: 2022-03-01 | Stop reason: HOSPADM

## 2022-02-27 RX ORDER — ONDANSETRON 2 MG/ML
4 INJECTION INTRAMUSCULAR; INTRAVENOUS EVERY 6 HOURS PRN
Status: DISCONTINUED | OUTPATIENT
Start: 2022-02-27 | End: 2022-02-27

## 2022-02-27 RX ORDER — SODIUM CHLORIDE 0.9 % (FLUSH) 0.9 %
5-40 SYRINGE (ML) INJECTION EVERY 12 HOURS SCHEDULED
Status: DISCONTINUED | OUTPATIENT
Start: 2022-02-27 | End: 2022-03-01 | Stop reason: HOSPADM

## 2022-02-27 RX ORDER — SODIUM CHLORIDE 9 MG/ML
25 INJECTION, SOLUTION INTRAVENOUS PRN
Status: DISCONTINUED | OUTPATIENT
Start: 2022-02-27 | End: 2022-03-01 | Stop reason: HOSPADM

## 2022-02-27 RX ORDER — 0.9 % SODIUM CHLORIDE 0.9 %
1000 INTRAVENOUS SOLUTION INTRAVENOUS ONCE
Status: COMPLETED | OUTPATIENT
Start: 2022-02-27 | End: 2022-02-27

## 2022-02-27 RX ORDER — DEXTROSE MONOHYDRATE 50 MG/ML
100 INJECTION, SOLUTION INTRAVENOUS PRN
Status: DISCONTINUED | OUTPATIENT
Start: 2022-02-27 | End: 2022-03-01 | Stop reason: HOSPADM

## 2022-02-27 RX ORDER — NICOTINE POLACRILEX 4 MG
15 LOZENGE BUCCAL PRN
Status: DISCONTINUED | OUTPATIENT
Start: 2022-02-27 | End: 2022-03-01 | Stop reason: HOSPADM

## 2022-02-27 RX ORDER — DILTIAZEM HYDROCHLORIDE 240 MG/1
240 CAPSULE, COATED, EXTENDED RELEASE ORAL DAILY
Status: DISCONTINUED | OUTPATIENT
Start: 2022-02-28 | End: 2022-03-01 | Stop reason: HOSPADM

## 2022-02-27 RX ORDER — SODIUM CHLORIDE 0.9 % (FLUSH) 0.9 %
5-40 SYRINGE (ML) INJECTION PRN
Status: DISCONTINUED | OUTPATIENT
Start: 2022-02-27 | End: 2022-03-01 | Stop reason: HOSPADM

## 2022-02-27 RX ORDER — ACETAMINOPHEN 325 MG/1
650 TABLET ORAL EVERY 6 HOURS PRN
Status: DISCONTINUED | OUTPATIENT
Start: 2022-02-27 | End: 2022-03-01 | Stop reason: HOSPADM

## 2022-02-27 RX ORDER — DEXTROSE MONOHYDRATE 25 G/50ML
12.5 INJECTION, SOLUTION INTRAVENOUS PRN
Status: DISCONTINUED | OUTPATIENT
Start: 2022-02-27 | End: 2022-02-27 | Stop reason: CLARIF

## 2022-02-27 RX ORDER — LEVOTHYROXINE SODIUM 0.15 MG/1
150 TABLET ORAL DAILY
Status: DISCONTINUED | OUTPATIENT
Start: 2022-02-27 | End: 2022-03-01 | Stop reason: HOSPADM

## 2022-02-27 RX ORDER — PROMETHAZINE HYDROCHLORIDE 25 MG/ML
6.25 INJECTION, SOLUTION INTRAMUSCULAR; INTRAVENOUS EVERY 6 HOURS PRN
Status: DISCONTINUED | OUTPATIENT
Start: 2022-02-27 | End: 2022-03-01 | Stop reason: HOSPADM

## 2022-02-27 RX ORDER — ACETAMINOPHEN 650 MG/1
650 SUPPOSITORY RECTAL EVERY 6 HOURS PRN
Status: DISCONTINUED | OUTPATIENT
Start: 2022-02-27 | End: 2022-03-01 | Stop reason: HOSPADM

## 2022-02-27 RX ORDER — DEXTROSE, SODIUM CHLORIDE, SODIUM LACTATE, POTASSIUM CHLORIDE, AND CALCIUM CHLORIDE 5; .6; .31; .03; .02 G/100ML; G/100ML; G/100ML; G/100ML; G/100ML
INJECTION, SOLUTION INTRAVENOUS CONTINUOUS
Status: DISCONTINUED | OUTPATIENT
Start: 2022-02-27 | End: 2022-03-01 | Stop reason: HOSPADM

## 2022-02-27 RX ORDER — SODIUM CHLORIDE 9 MG/ML
INJECTION, SOLUTION INTRAVENOUS CONTINUOUS
Status: DISCONTINUED | OUTPATIENT
Start: 2022-02-27 | End: 2022-02-27

## 2022-02-27 RX ORDER — ONDANSETRON 4 MG/1
4 TABLET, ORALLY DISINTEGRATING ORAL EVERY 8 HOURS PRN
Status: DISCONTINUED | OUTPATIENT
Start: 2022-02-27 | End: 2022-02-27

## 2022-02-27 RX ORDER — PANTOPRAZOLE SODIUM 40 MG/10ML
40 INJECTION, POWDER, LYOPHILIZED, FOR SOLUTION INTRAVENOUS DAILY
Status: DISCONTINUED | OUTPATIENT
Start: 2022-02-27 | End: 2022-03-01 | Stop reason: HOSPADM

## 2022-02-27 RX ADMIN — SODIUM CHLORIDE 1000 ML: 9 INJECTION, SOLUTION INTRAVENOUS at 02:31

## 2022-02-27 RX ADMIN — PANTOPRAZOLE SODIUM 40 MG: 40 INJECTION, POWDER, FOR SOLUTION INTRAVENOUS at 02:18

## 2022-02-27 RX ADMIN — SODIUM CHLORIDE, SODIUM LACTATE, POTASSIUM CHLORIDE, CALCIUM CHLORIDE AND DEXTROSE MONOHYDRATE: 5; 600; 310; 30; 20 INJECTION, SOLUTION INTRAVENOUS at 22:29

## 2022-02-27 RX ADMIN — LEVOTHYROXINE SODIUM 150 MCG: 0.15 TABLET ORAL at 06:27

## 2022-02-27 RX ADMIN — SODIUM CHLORIDE: 9 INJECTION, SOLUTION INTRAVENOUS at 06:27

## 2022-02-27 RX ADMIN — ENOXAPARIN SODIUM 40 MG: 100 INJECTION SUBCUTANEOUS at 08:14

## 2022-02-27 RX ADMIN — SODIUM CHLORIDE, SODIUM LACTATE, POTASSIUM CHLORIDE, CALCIUM CHLORIDE AND DEXTROSE MONOHYDRATE: 5; 600; 310; 30; 20 INJECTION, SOLUTION INTRAVENOUS at 11:27

## 2022-02-27 ASSESSMENT — PAIN SCALES - GENERAL
PAINLEVEL_OUTOF10: 0

## 2022-02-27 NOTE — CONSULTS
INPATIENT CARDIOLOGY CONSULT    Name: Omayra Musa    Age: 80 y.o. Date of Admission: 2/26/2022  9:00 PM    Date of Service: 2/27/2022    Reason for Consultation: Syncope    Chief Complaint   Patient presents with    Abdominal Pain     since yesterday morning         Referring Physician: Dash Warner DO    History of Present Illness: 60-year-old male with history of COPD, GERD, hiatal hernia, hypertension, hyperlipidemia, hypothyroidism, psoriasis, vascular dementia, history of paroxysmal SVT for which he has been asymptomatic as well as history of PVCs and right bundle branch block. He presented with abdominal complaints and found to have small bowel obstruction and what appeared to be vasovagal syncope. Cardiology consulted for further evaluation and management.             Past Medical History:  Past Medical History:   Diagnosis Date    Allergic rhinitis     DUE TO POLLEN     Anxiety     Arthritis     Chronic kidney disease     COPD (chronic obstructive pulmonary disease) (HCC)     GERD (gastroesophageal reflux disease)     Hiatal hernia     History of cardiovascular stress test 3-18-13    nuclear exercise    Hyperlipidemia     Hypertension     Hypothyroidism     Nontoxic (diffuse) goiter     Psoriasis     Vascular dementia with depressed mood ([facility] [address] 2/21/2020    Vascular dementia with depressed mood ([facility])        Review of Systems:   Cardiac: As per HPI  General: Denies fever or chills  Pulmonary: As per HPI  HEENT: Denies runny nose  GI: No complaints  : No complaints  Endocrine: Denies night sweats  Musculoskeletal: No complaints  Skin: Dry skin  Neuro: No complaints  Psych: Denies depression      Past Surgical History:  Past Surgical History:   Procedure Laterality Date    CHOLECYSTECTOMY      COLONOSCOPY  12/13/2012    ECHO COMPL W DOP COLOR FLOW  3/18/2013         ENDOSCOPY, COLON, DIAGNOSTIC      EYE SURGERY      CATARACT    TONSILLECTOMY         Family History:  Family Allergies    Home Medications:  Prior to Admission medications    Medication Sig Start Date End Date Taking?  Authorizing Provider   dilTIAZem (CARDIZEM CD) 240 MG extended release capsule Take 1 capsule by mouth daily 11/22/21  Yes Jack Arguello DO   levothyroxine (SYNTHROID) 150 MCG tablet Take 1 tablet by mouth Daily 11/22/21  Yes Jack Arguello DO   losartan (COZAAR) 100 MG tablet Take 1 tablet by mouth daily 11/22/21  Yes Jack Arguello DO   omeprazole (PRILOSEC) 40 MG delayed release capsule Take 1 capsule by mouth daily 11/22/21  Yes Jack Arguello, DO   tamsulosin [facility]) 0.4 MG capsule  9/7/21  Yes Historical Provider, MD   fluocinonide (LIDEX) 0.05 % external solution APPLY TO BUMPS ON SCALP TWICE DAILY AS NEEDED 6/3/21  Yes Historical Provider, MD   Multiple Vitamins-Minerals (CENTRUM SILVER ULTRA MENS) TABS Take 1 tablet by mouth daily   Yes Historical Provider, MD       Current Medications:  Current Facility-Administered Medications   Medication Dose Route Frequency Provider Last Rate Last Admin    glucose (GLUTOSE) 40 % oral gel 15 g  15 g Oral PRN Tez Olmedo DO        glucagon (rDNA) injection 1 mg  1 mg IntraMUSCular PRN Tez Olmedo DO        dextrose 5 % solution  100 mL/hr IntraVENous PRN Tez Olmedo DO        sodium chloride flush 0.9 % injection 5-40 mL  5-40 mL IntraVENous 2 times per day Tez Olmedo DO        sodium chloride flush 0.9 % injection 5-40 mL  5-40 mL IntraVENous PRN Tez Olmedo DO        0.9 % sodium chloride infusion  25 mL IntraVENous PRN Tez Olmedo DO        enoxaparin (LOVENOX) injection 40 mg  40 mg SubCUTAneous Daily Tez Olmedo DO   40 mg at 02/27/22 1392    polyethylene glycol (GLYCOLAX) packet 17 g  17 g Oral Daily PRN Tez Olmedo DO        acetaminophen (TYLENOL) tablet 650 mg  650 mg Oral Q6H PRN Tez Olmedo DO        Or    acetaminophen (TYLENOL) suppository 650 mg  650 mg Rectal Q6H PRN Chris Cox Guillermina, DO        dextrose bolus (hypoglycemia) 10% 125 mL  125 mL IntraVENous PRN Ivory Bryan, DO        Or    dextrose bolus (hypoglycemia) 10% 250 mL  250 mL IntraVENous PRN Ivory Bryan, DO        levothyroxine (SYNTHROID) tablet 150 mcg  150 mcg Oral Daily Ivory Bryan, DO   150 mcg at 02/27/22 3495    [START ON 2/28/2022] tamsulosin (FLOMAX) capsule 0.4 mg  0.4 mg Oral Daily Ivory Bryan, DO        promethazine (PHENERGAN) injection 6.25 mg  6.25 mg IntraMUSCular Q6H PRN Ivory Bryan, DO        pantoprazole (PROTONIX) injection 40 mg  40 mg IntraVENous Daily Ivory Bryan, DO   40 mg at 02/27/22 0218    dextrose 5 % in lactated ringers infusion   IntraVENous Continuous Cecilia Monas, DO 90 mL/hr at 02/27/22 1127 New Bag at 02/27/22 1127      dextrose      sodium chloride      dextrose 5% in lactated ringers 90 mL/hr at 02/27/22 1127       Physical Exam:  /80   Pulse 77   Temp 97.9 °F (36.6 °C) (Oral)   Resp 18   Ht 5' 8\" (1.727 m)   Wt 195 lb (88.5 kg)   SpO2 96%   BMI 29.65 kg/m²   Wt Readings from Last 3 Encounters:   02/27/22 195 lb (88.5 kg)   11/22/21 202 lb 1.6 oz (91.7 kg)   09/10/21 200 lb 4.8 oz (90.9 kg)       Appearance: Alert and oriented x3 not in acute distress.   Skin: Dry skin  Head: Atraumatic  Eyes: Intact extraocular muscles   ENMT: Mucous membranes are moist  Neck: Supple  Lungs: Clear to auscultation  Cardiac: Normal S1 and S2  Abdomen: Protuberant  Extremities: Intact range of motion  Neurologic: No focal neurological deficits  Peripheral Pulses: 2+ peripheral pulses    Intake/Output:    Intake/Output Summary (Last 24 hours) at 2/27/2022 1729  Last data filed at 2/27/2022 1551  Gross per 24 hour   Intake 2280.99 ml   Output 1350 ml   Net 930.99 ml     I/O this shift:  In: 2101 [I.V.:1101; IV Piggyback:1000]  Out: 100 [Emesis/NG output:100]    Laboratory Tests:  Recent Labs     02/26/22 2129 02/27/22  0503    137   K 3.9 3.8   CL 94* 103 CO2 25 23   BUN 22 21   CREATININE 1.1 0.9   GLUCOSE 142* 109*   CALCIUM 9.9 8.9     Lab Results   Component Value Date    MG 2.0 02/27/2022    MG 2.0 03/18/2013    MG 2.2 07/30/2012     Recent Labs     02/26/22 2129 02/27/22  0503   ALKPHOS 85 76   ALT 17 19   AST 20 21   PROT 7.4 6.6   BILITOT 0.7 0.6   BILIDIR <0.2  --    LABALBU 4.3 3.9     Recent Labs     02/26/22 2129 02/27/22  0503   WBC 8.7 6.4   RBC 5.48 5.15   HGB 17.6* 16.5   HCT 53.6 50.6   MCV 97.8 98.3   MCH 32.1 32.0   MCHC 32.8 32.6   RDW 13.0 13.0    167   MPV 9.4 10.1     Lab Results   Component Value Date    CKTOTAL 84 03/18/2013    CKMB 0.8 03/18/2013    TROPONINI <0.01 03/18/2013    TROPONINI <0.01 03/17/2013     Recent Labs     02/26/22 2129 02/26/22  2230   TROPHS 12* 12*     No results found for: INR, PROTIME  Lab Results   Component Value Date    TSH 1.540 02/27/2022    TSH 2.110 11/22/2021    TSH 1.610 11/11/2020     Lab Results   Component Value Date    LABA1C 5.4 11/22/2021    LABA1C 5.6 11/11/2020    LABA1C 5.6 09/12/2019     No results found for: EAG  Lab Results   Component Value Date    CHOL 190 11/22/2021    CHOL 176 11/11/2020    CHOL 194 09/12/2019     Lab Results   Component Value Date    TRIG 107 11/22/2021    TRIG 105 11/11/2020    TRIG 107 09/12/2019     Lab Results   Component Value Date    HDL 54 11/22/2021    HDL 49 11/11/2020    HDL 52 09/12/2019     Lab Results   Component Value Date    LDLCALC 115 (H) 11/22/2021    LDLCALC 106 (H) 11/11/2020    LDLCALC 121 (H) 09/12/2019     Lab Results   Component Value Date    LABVLDL 21 11/22/2021    LABVLDL 21 11/11/2020    LABVLDL 21 09/12/2019    VLDL 17 03/30/2018     Lab Results   Component Value Date    CHOLHDLRATIO 3.40 03/30/2018     Recent Labs     02/26/22  2129   PROBNP 67       Cardiac Tests:  EKG reviewed (EKG date: Sinus rhythm, 99 bpm, anterolateral Q waves and inferior Q waves and right bundle branch block):        Echocardiogram reviewed: March 2013  Summary    Left ventricular size and wall motion normal, EF 65%. Mild LVH. Stage I diastolic dysfunction. There is trace to mild tricuspid regurgitation. No evidence of pericardial effusion. Stress test reviewed: March 2013  IMPRESSION-     1. Unremarkable study.           Ejection fraction-       SPECT gated images of the left ventricular myocardium were obtained for   purposes of left ventricular ejection fraction determination.  Left   ventricular ejection fraction is calculated at 66%.         IMPRESSION-    1.  LVEF 66%.             Cardiac catheterization reviewed:     CXR reviewed: The ASCVD Risk score (Zaida Russell, et al., 2013) failed to calculate for the following reasons: The 2013 ASCVD risk score is only valid for ages 36 to 78    ASSESSMENT / PLAN:    1. Generalized abdominal pain  Management per primary and GI  Awaiting EGD  2. Small bowel obstruction (HCC)  Management per primary and GI  3. Syncope and collapse  Etiology likely vasovagal as wife report patient was on the toilet at the time this happened and in the same time he was having significant abdominal pain  Obtain updated echocardiogram to guide therapy  He has history of nonsustained SVT and occasional PVCs and will benefit from outpatient Zio patch heart monitor if no arrhythmias are found during hospitalization  4. Chest pain, unspecified type  Obtain echocardiogram to guide therapy  5. History of nonsustained SVT and occasional PVCs as well as right bundle branch block  Monitor on telemetry  5. COPD  6. GERD  7. Hiatal hernia  8. Hypertension  9. Hyperlipidemia  10. Hypothyroidism  11. Psoriasis  12. Vascular dementia        Thank you for allowing me to participate in your patient's care. Please feel free to contact me if you have any questions or concerns.     Sachi Lopez MD  [facility]) Cardiology

## 2022-02-27 NOTE — ED PROVIDER NOTES
80-year-old male presenting to the emergency department for nausea, diaphoresis, epigastric abdominal discomfort, has been ongoing since 330 this morning. His wife awoke to found him nauseous, and attempting a bowel movement in the toilet, was very diaphoretic, and then passed out. She has been attempting to treat his symptoms at home with Pepcid, Protonix, and Tums without Shushan Finger of his symptoms which is why he came to the emergency department. He denies any fevers, chills, chest pain, chest tightness, shortness of breath, cough, congestion. Onset of symptoms this morning, persistent, nothing makes them better, nothing make them worse, epigastric discomfort associated with constant nausea. Review of Systems   Constitutional: Positive for diaphoresis. Negative for chills and fever. HENT: Negative for ear pain, sinus pressure and sore throat. Eyes: Negative for pain and discharge. Respiratory: Negative for cough, shortness of breath and wheezing. Cardiovascular: Negative for chest pain. Gastrointestinal: Positive for abdominal pain and nausea. Negative for diarrhea and vomiting. Genitourinary: Negative for dysuria and frequency. Musculoskeletal: Negative for arthralgias and back pain. Skin: Negative for rash and wound. Neurological: Positive for syncope (Lost consciousness on the toilet per his wife). Negative for weakness and headaches. Hematological: Negative for adenopathy. All other systems reviewed and are negative. Physical Exam  Vitals and nursing note reviewed. Constitutional:       Appearance: He is well-developed. HENT:      Head: Normocephalic and atraumatic. Right Ear: External ear normal.      Left Ear: External ear normal.      Nose: Nose normal.      Mouth/Throat:      Mouth: Mucous membranes are moist.   Eyes:      Extraocular Movements: Extraocular movements intact.       Conjunctiva/sclera: Conjunctivae normal.      Pupils: Pupils are equal, round, and reactive to light. Cardiovascular:      Rate and Rhythm: Regular rhythm. Tachycardia present. Heart sounds: Normal heart sounds. No murmur heard. Pulmonary:      Effort: Pulmonary effort is normal. No respiratory distress. Breath sounds: Normal breath sounds. No wheezing or rales. Abdominal:      General: Bowel sounds are normal. There is no distension. Palpations: Abdomen is soft. Tenderness: There is abdominal tenderness (Epigastric tenderness). There is no guarding or rebound. Musculoskeletal:         General: No tenderness or deformity. Cervical back: Normal range of motion and neck supple. Skin:     General: Skin is warm and dry. Neurological:      Mental Status: He is alert and oriented to person, place, and time. Cranial Nerves: No cranial nerve deficit. Coordination: Coordination normal.          Procedures     MDM     ED Course as of 02/27/22 0127   Sat Feb 26, 2022 2114 ATTENDING PROVIDER ATTESTATION:     I have personally performed and/or participated in the history, exam, medical decision making, and procedures and agree with all pertinent clinical information unless otherwise noted. I have also reviewed and agree with the past medical, family and social history unless otherwise noted. I have discussed this patient in detail with the resident, and provided the instruction and education regarding patient here complaining of some mid abdominal, epigastric and sternal chest and abdominal aching or discomfort. No actual pain. Has had some nausea with it with mild retching but no vomiting. No diarrhea. No sweating or shortness of breath and no palpitations. Symptoms started yesterday about 2 AM.  No leg pain or swelling. No history of blood clots. Has already had his gallbladder removed. Has tried multiple antacids with no resolution. .  My findings/plan: Patient laying the bed resting comfortably no acute distress.   Heart rate regular, lungs are clear and equal.  Abdomen soft and nontender with no pulsatile mass. No CVA tenderness. No jaundice or icterus. Arms legs are neurovascular intact with no pretibial edema or calf pain. [NC]   2120 EKG: This EKG is signed by emergency department physician. Rate: 99  Rhythm: Normal sinus rhythm, low voltage QRS  Interpretation: non-specific EKG  Comparison: No previous EKG to compare to     [JG]   2222 Initial troponin XII, will check a delta [JG]   2337 Appears to have a small bowel obstruction, will place an NG, and admit, put out consult to Dr. Alia Porras as well as general surgery for the small bowel obstruction [JG]   [age]year-old male presenting emergency department for abdominal pain, nausea, vomiting, ongoing since 330 this morning [JG]      ED Course User Index  [JG] Kristopher Multani MD  [NC] Domi Chauhan,       80year-old male presenting emergency department for abdominal pain, nausea, vomiting, ongoing since 330 this morning. Had an episode of syncope only was on the toilet, initial story concerning for possible cardiac etiology, however EKG was relatively unremarkable for acute myocardial ischemia, and troponins were unremarkable, found to have a bowel obstruction on CT, NG was placed, surgery consulted, admitted to hospital given patient's history of syncope and cardiac disease medically. ED Course as of 02/27/22 0128   Sat Feb 26, 2022 2114 ATTENDING PROVIDER ATTESTATION:     I have personally performed and/or participated in the history, exam, medical decision making, and procedures and agree with all pertinent clinical information unless otherwise noted. I have also reviewed and agree with the past medical, family and social history unless otherwise noted.     I have discussed this patient in detail with the resident, and provided the instruction and education regarding patient here complaining of some mid abdominal, epigastric and sternal chest and abdominal aching or discomfort. No actual pain. Has had some nausea with it with mild retching but no vomiting. No diarrhea. No sweating or shortness of breath and no palpitations. Symptoms started yesterday about 2 AM.  No leg pain or swelling. No history of blood clots. Has already had his gallbladder removed. Has tried multiple antacids with no resolution. .  My findings/plan: Patient laying the bed resting comfortably no acute distress. Heart rate regular, lungs are clear and equal.  Abdomen soft and nontender with no pulsatile mass. No CVA tenderness. No jaundice or icterus. Arms legs are neurovascular intact with no pretibial edema or calf pain. [NC]   2120 EKG: This EKG is signed by emergency department physician. Rate: 99  Rhythm: Normal sinus rhythm, low voltage QRS  Interpretation: non-specific EKG  Comparison: No previous EKG to compare to     [JG]   2222 Initial troponin XII, will check a delta [JG]   2337 Appears to have a small bowel obstruction, will place an NG, and admit, put out consult to Dr. Maria A Harris as well as general surgery for the small bowel obstruction [JG]   [age]year-old male presenting emergency department for abdominal pain, nausea, vomiting, ongoing since 330 this morning [JG]      ED Course User Index  [JG] Aura Arreguin MD  [NC] Jorge Mejia, DO       --------------------------------------------- PAST HISTORY ---------------------------------------------  Past Medical History:  has a past medical history of Allergic rhinitis, Anxiety, Arthritis, Chronic kidney disease, COPD (chronic obstructive pulmonary disease) ([facility] [address] GERD (gastroesophageal reflux disease), Hiatal hernia, History of cardiovascular stress test, Hyperlipidemia, Hypertension, Hypothyroidism, Nontoxic (diffuse) goiter, Psoriasis, Vascular dementia with depressed mood ([facility] [address] and Vascular dementia with depressed mood ([facility] [address]     Past Surgical History:  has a past surgical history that includes Cholecystectomy; Tonsillectomy; Endoscopy, colon, diagnostic; ECHO Compl W Dop Color Flow (3/18/2013); Colonoscopy (12/13/2012); and eye surgery. Social History:  reports that he has never smoked. He has never used smokeless tobacco. He reports that he does not drink alcohol and does not use drugs. Family History: family history includes Diabetes in his mother; Hearing Loss in his brother. The patients home medications have been reviewed. Allergies: Patient has no known allergies.     -------------------------------------------------- RESULTS -------------------------------------------------    LABS:  Results for orders placed or performed during the hospital encounter of 02/26/22   CBC with Auto Differential   Result Value Ref Range    WBC 8.7 4.5 - 11.5 E9/L    RBC 5.48 3.80 - 5.80 E12/L    Hemoglobin 17.6 (H) 12.5 - 16.5 g/dL    Hematocrit 53.6 37.0 - 54.0 %    MCV 97.8 80.0 - 99.9 fL    MCH 32.1 26.0 - 35.0 pg    MCHC 32.8 32.0 - 34.5 %    RDW 13.0 11.5 - 15.0 fL    Platelets 727 147 - 183 E9/L    MPV 9.4 7.0 - 12.0 fL    Neutrophils % 83.1 (H) 43.0 - 80.0 %    Immature Granulocytes % 0.2 0.0 - 5.0 %    Lymphocytes % 9.1 (L) 20.0 - 42.0 %    Monocytes % 7.2 2.0 - 12.0 %    Eosinophils % 0.3 0.0 - 6.0 %    Basophils % 0.1 0.0 - 2.0 %    Neutrophils Absolute 7.22 1.80 - 7.30 E9/L    Immature Granulocytes # 0.02 E9/L    Lymphocytes Absolute 0.79 (L) 1.50 - 4.00 E9/L    Monocytes Absolute 0.63 0.10 - 0.95 E9/L    Eosinophils Absolute 0.03 (L) 0.05 - 0.50 E9/L    Basophils Absolute 0.01 0.00 - 0.20 M9/B   Basic Metabolic Panel w/ Reflex to MG   Result Value Ref Range    Sodium 133 132 - 146 mmol/L    Potassium reflex Magnesium 3.9 3.5 - 5.0 mmol/L    Chloride 94 (L) 98 - 107 mmol/L    CO2 25 22 - 29 mmol/L    Anion Gap 14 7 - 16 mmol/L    Glucose 142 (H) 74 - 99 mg/dL    BUN 22 6 - 23 mg/dL    CREATININE 1.1 0.7 - 1.2 mg/dL    GFR Non-African American >60 >=60 mL/min/1.73    GFR African American >60 Calcium 9.9 8.6 - 10.2 mg/dL   Hepatic Function Panel   Result Value Ref Range    Total Protein 7.4 6.4 - 8.3 g/dL    Albumin 4.3 3.5 - 5.2 g/dL    Alkaline Phosphatase 85 40 - 129 U/L    ALT 17 0 - 40 U/L    AST 20 0 - 39 U/L    Total Bilirubin 0.7 0.0 - 1.2 mg/dL    Bilirubin, Direct <0.2 0.0 - 0.3 mg/dL    Bilirubin, Indirect see below 0.0 - 1.0 mg/dL   Troponin   Result Value Ref Range    Troponin, High Sensitivity 12 (H) 0 - 11 ng/L   Brain Natriuretic Peptide   Result Value Ref Range    Pro-BNP 67 0 - 450 pg/mL   Urinalysis with Microscopic   Result Value Ref Range    Color, UA DKYELLOW Straw/Yellow    Clarity, UA SLCLOUDY Clear    Glucose, Ur Negative Negative mg/dL    Bilirubin Urine Negative Negative    Ketones, Urine 15 (A) Negative mg/dL    Specific Gravity, UA 1.025 1.005 - 1.030    Blood, Urine Negative Negative    pH, UA 5.5 5.0 - 9.0    Protein, UA TRACE Negative mg/dL    Urobilinogen, Urine 0.2 <2.0 E.U./dL    Nitrite, Urine Negative Negative    Leukocyte Esterase, Urine Negative Negative    WBC, UA 1-3 0 - 5 /HPF    RBC, UA NONE 0 - 2 /HPF    Epithelial Cells, UA RARE /HPF    Bacteria, UA RARE (A) None Seen /HPF    Crystals, UA Few (A) None Seen /HPF   Lipase   Result Value Ref Range    Lipase 11 (L) 13 - 60 U/L   Troponin   Result Value Ref Range    Troponin, High Sensitivity 12 (H) 0 - 11 ng/L   EKG 12 Lead   Result Value Ref Range    Ventricular Rate 99 BPM    Atrial Rate 99 BPM    P-R Interval 152 ms    QRS Duration 114 ms    Q-T Interval 354 ms    QTc Calculation (Bazett) 454 ms    P Axis 70 degrees    R Axis -95 degrees    T Axis 40 degrees       RADIOLOGY:  XR CHEST PORTABLE   Final Result   NG tube tip in the proximal body of the stomach. CT ABDOMEN PELVIS W IV CONTRAST Additional Contrast? None   Final Result   1. Dilated stomach and proximal to mid small bowel caliber transition   although not specifically localized.   There is also fluid in normal caliber   distal bowel and throughout colon. Findings could relate to bowel   obstruction versus enteritis/enterocolitis. 2. Fluid in distal esophagus likely reflects GE reflux. 3. Sigmoid diverticulosis. No acute diverticulitis seen. 4. Dissection of the SMA appears unchanged from 03/16/2020   5. Unchanged fat containing small inguinal hernias. 6. Coronary artery calcifications. 7.      XR CHEST PORTABLE   Final Result   No acute process. ------------------------- NURSING NOTES AND VITALS REVIEWED ---------------------------  Date / Time Roomed:  2/26/2022  9:00 PM  ED Bed Assignment:  [id number]    The nursing notes within the ED encounter and vital signs as below have been reviewed. Patient Vitals for the past 24 hrs:   BP Temp Temp src Pulse Resp SpO2 Height Weight   02/27/22 0105 -- -- -- -- -- -- 5' 8\" (1.727 m) 195 lb (88.5 kg)   02/27/22 0041 (!) 142/99 97.8 °F (36.6 °C) Oral 86 18 98 % -- --   02/26/22 2307 136/70 -- -- 95 16 97 % -- --   02/26/22 2100 -- 98.2 °F (36.8 °C) Oral -- -- -- -- --   02/26/22 2058 134/67 -- -- 100 18 96 % -- 200 lb (90.7 kg)       Oxygen Saturation Interpretation: Normal    ------------------------------------------ PROGRESS NOTES ------------------------------------------      Counseling:  I have spoken with the patient and discussed todays results, in addition to providing specific details for the plan of care and counseling regarding the diagnosis and prognosis. Their questions are answered at this time and they are agreeable with the plan of admission.    --------------------------------- ADDITIONAL PROVIDER NOTES ---------------------------------  Consultations:  Time: 2355. Spoke with Dr. Fani Peterson. Discussed case. They will admit the patient.   This patient's ED course included: a personal history and physicial examination, re-evaluation prior to disposition, multiple bedside re-evaluations, IV medications, cardiac monitoring and continuous pulse oximetry    This

## 2022-02-27 NOTE — CONSULTS
Jewels Contreras  2/27/2022      Surgical Consult    CHIEF COMPLAINT:  Nausea, abdominal distension    HISTORY OF PRESENT ILLNESS:  Jewels Contreras is an 80 y.o. male with 1 day of nausea and abdominal distension. Bowel moved before coming to the hospital. CT abdomen showed massively distended stomach with fluid in the entire small bowel and colon, read as possible obstruction or enteritis. NG tube placed with 700 cc returned, pain and nausea resolved completely. No pain or distension today. No one else in the family sick. Weight: 200 lb (90.7 kg). Past Medical History: He has a past medical history of Allergic rhinitis, Anxiety, Arthritis, Chronic kidney disease, COPD (chronic obstructive pulmonary disease) ([address] GERD (gastroesophageal reflux disease), Hiatal hernia, History of cardiovascular stress test, Hyperlipidemia, Hypertension, Hypothyroidism, Nontoxic (diffuse) goiter, Psoriasis, Vascular dementia with depressed mood ([address] and Vascular dementia with depressed mood ([facility] [address] Past Surgical History: He has a past surgical history that includes Cholecystectomy; Tonsillectomy; Endoscopy, colon, diagnostic; ECHO Compl W Dop Color Flow (3/18/2013); Colonoscopy (12/13/2012); and eye surgery. Allergies: Patient has no known allergies. Home Medicines:   Prior to Visit Medications    Medication Sig Taking?  Authorizing Provider   dilTIAZem (CARDIZEM CD) 240 MG extended release capsule Take 1 capsule by mouth daily Yes Jeffry [place], DO   levothyroxine (SYNTHROID) 150 MCG tablet Take 1 tablet by mouth Daily Yes Jeffry Sheldon, DO   losartan (COZAAR) 100 MG tablet Take 1 tablet by mouth daily Yes Jeffry [place], DO   omeprazole (PRILOSEC) 40 MG delayed release capsule Take 1 capsule by mouth daily Yes Jeffry [place], DO   tamsulosin (FLOMAX) 0.4 MG capsule  Yes Historical Provider, MD   fluocinonide (LIDEX) 0.05 % external solution APPLY TO BUMPS ON SCALP TWICE DAILY AS NEEDED Yes Historical Provider, MD   Multiple Vitamins-Minerals (CENTRUM SILVER ULTRA MENS) TABS Take 1 tablet by mouth daily Yes Historical Provider, MD       Social History:   TOBACCO:   reports that he has never smoked. He has never used smokeless tobacco.  ETOH:    reports no history of alcohol use. Problem Relation Age of Onset    Diabetes Mother     Hearing Loss Brother         REVIEW OF SYSTEMS:  Constitutional: negative  Respiratory: negative  Cardiovascular: negative  Gastrointestinal: negative  Musculoskeletal:negative  Behavioral/Psych: negative  All others reviewed, negative    Recent Labs     02/26/22 2129 02/27/22  0503   WBC 8.7 6.4   HGB 17.6* 16.5    167    137   CL 94* 103   K 3.9 3.8   BUN 22 21   CREATININE 1.1 0.9   GLUCOSE 142* 109*   LABALBU 4.3 3.9   PROT 7.4 6.6   CALCIUM 9.9 8.9   MG  --  2.0   PHOS  --  3.5   BILITOT 0.7 0.6   BILIDIR <0.2  --    ALKPHOS 85 76   AST 20 21   ALT 17 19       Physical exam:   VITALS:  Blood pressure (!) 143/92, pulse 78, temperature 97.9 °F (36.6 °C), temperature source Oral, resp. rate 16, height 5' 8\" (1.727 m), weight 195 lb (88.5 kg), SpO2 96 %. General appearance: alert, appears stated age and cooperative  Head: Normocephalic, without obvious abnormality, atraumatic  Eyes: PERRL, EOMI  Neck: no adenopathy, no carotid bruit, no JVD, supple, symmetrical, trachea midline and thyroid not enlarged, symmetric, no tenderness/mass/nodules  Lungs: clear to auscultation bilaterally  Heart: regular rate and rhythm,   Abdomen: soft, non tender; bowel sounds normal; no hernias palpable, several well healed lap sukhi scars  Extremities: extremities normal, atraumatic, no cyanosis or edema    ASSESSMENT:   Nausea and vomiting, gastric distension seen on CT. Symptoms resolved with NG drainage of the distended stomach    PLAN:  NG to suction. IV fluids  If pain returns, will need CT with oral contrast.  Will need an EGD as an outpatient.     Signed: Dr. Sapphire Mirza CESAR Kim copy of H&P to PCP, Chencho Mancilla, DO

## 2022-02-27 NOTE — PROGRESS NOTES
Department of Internal Medicine  PN    PCP: Ani Sears DO  Admitting Physician: Dr. Flaquito Neal  Consultants: Dr. Opal Wu  Date of Service: 2/26/2022    CHIEF COMPLAINT:  Syncope/abdominal pain    HISTORY OF PRESENT ILLNESS:    Patient is 54-year-old male who presents to the ED due to syncopal events as well as abdominal discomfort and distention. Patient wife is present at bedside. Patient has been having mid to right upper quadrant abdominal pain for the last few weeks. However earlier today patient did not feel well. He had increased abdominal pain and fullness. He was on the toilet earlier today around 3:30 AM. At that time he did not feel well and he stated so to his wife. After mentioning that he passed out into the bathtub. He was only out for a few seconds. He came to women back to sleep. After waking up he still did not feel well. As such he did not partake in any oral intake. He did not partake in meals. He denies any fever or chills. He denies any bloody or melena. States his last bowel movement was earlier today. States it was formed and hard. Otherwise his appetite has been good. He has been experiencing increased GERD/heartburn. However he is not having any issues with emesis. . Patient does have history of cholecystectomy. He does also have history of syncope in the past and this was related to his episode of cholecystitis. 2/27/2022  Patient seen examined on telemetry floor. Patient's wife at the bedside and case discussed. The patient denies any abdominal pain at this time. There is no nausea vomiting. Patient has NG tube in place. BUN/creatinine 21/0.9. WBC 6.4 with hemoglobin 16.5. Temperature is 97.9 with heart rate 70 and blood pressure 143/92. O2 sat 96% room air at rest.  General surgery note reviewed. Routine labs in a.m. IV fluids lactated Ringer's 90 cc an hour.     PAST MEDICAL Hx:  Past Medical History:   Diagnosis Date    Allergic rhinitis     DUE TO POLLEN     Anxiety  Arthritis     Chronic kidney disease     COPD (chronic obstructive pulmonary disease) (HCC)     GERD (gastroesophageal reflux disease)     Hiatal hernia     History of cardiovascular stress test 3-18-13    nuclear exercise    Hyperlipidemia     Hypertension     Hypothyroidism     Nontoxic (diffuse) goiter     Psoriasis     Vascular dementia with depressed mood ([facility] [address] 2/21/2020    Vascular dementia with depressed mood (HCC)        PAST SURGICAL Hx:   Past Surgical History:   Procedure Laterality Date    CHOLECYSTECTOMY      COLONOSCOPY  12/13/2012    ECHO COMPL W DOP COLOR FLOW  3/18/2013         ENDOSCOPY, COLON, DIAGNOSTIC      EYE SURGERY      CATARACT    TONSILLECTOMY         FAMILY Hx:  Family History   Problem Relation Age of Onset    Diabetes Mother     Hearing Loss Brother        HOME MEDICATIONS:  Prior to Admission medications    Medication Sig Start Date End Date Taking? Authorizing Provider   dilTIAZem (CARDIZEM CD) 240 MG extended release capsule Take 1 capsule by mouth daily 11/22/21  Yes Bonifacio Huizar DO   levothyroxine (SYNTHROID) 150 MCG tablet Take 1 tablet by mouth Daily 11/22/21  Yes Bonifacio Huizar DO   losartan (COZAAR) 100 MG tablet Take 1 tablet by mouth daily 11/22/21  Yes Bonifacio Huizar DO   omeprazole (PRILOSEC) 40 MG delayed release capsule Take 1 capsule by mouth daily 11/22/21  Yes Bonifacio Huizar DO   tamsulosin [facility]) 0.4 MG capsule  9/7/21  Yes Historical Provider, MD   fluocinonide (LIDEX) 0.05 % external solution APPLY TO BUMPS ON SCALP TWICE DAILY AS NEEDED 6/3/21  Yes Historical Provider, MD   Multiple Vitamins-Minerals (CENTRUM SILVER ULTRA MENS) TABS Take 1 tablet by mouth daily   Yes Historical Provider, MD       ALLERGIES:  Patient has no known allergies.     SOCIAL Hx:  Social History     Socioeconomic History    Marital status:      Spouse name: Not on file    Number of children: Not on file    Years of education: Not on file    Highest education level: Not on file   Occupational History    Not on file   Tobacco Use    Smoking status: Never Smoker    Smokeless tobacco: Never Used   Substance and Sexual Activity    Alcohol use: No     Comment: occasionally    Drug use: No    Sexual activity: Not Currently   Other Topics Concern    Not on file   Social History Narrative    Not on file     Social Determinants of Health     Financial Resource Strain:     Difficulty of Paying Living Expenses: Not on file   Food Insecurity:     Worried About Running Out of Food in the Last Year: Not on file    Ora of Food in the Last Year: Not on file   Transportation Needs:     Lack of Transportation (Medical): Not on file    Lack of Transportation (Non-Medical):  Not on file   Physical Activity:     Days of Exercise per Week: Not on file    Minutes of Exercise per Session: Not on file   Stress:     Feeling of Stress : Not on file   Social Connections:     Frequency of Communication with Friends and Family: Not on file    Frequency of Social Gatherings with Friends and Family: Not on file    Attends Jewish Services: Not on file    Active Member of [address] [place] [address] or Organizations: Not on file    Attends Club or Organization Meetings: Not on file    Marital Status: Not on file   Intimate Partner Violence:     Fear of Current or Ex-Partner: Not on file    Emotionally Abused: Not on file    Physically Abused: Not on file    Sexually Abused: Not on file   Housing Stability:     Unable to Pay for Housing in the Last Year: Not on file    Number of [place] in the Last Year: Not on file    Unstable Housing in the Last Year: Not on file       ROS: Positive in bold  General:   Denies chills, fatigue, fever, malaise, night sweats or weight loss    Psychological:   Denies anxiety, disorientation or hallucinations    ENT:    Denies epistaxis, headaches, vertigo or visual changes    Cardiovascular:   Denies any chest pain, irregular heartbeats, or palpitations. No paroxysmal nocturnal dyspnea. Respiratory:   Denies shortness of breath, coughing, sputum production, hemoptysis, or wheezing. No orthopnea. Gastrointestinal:   Denies nausea, vomiting, diarrhea, or constipation. Denies any abdominal pain. Denies change in bowel habits or stools. Genito-Urinary:    Denies any urgency, frequency, hematuria. Voiding without difficulty. Musculoskeletal:   Denies joint pain, joint stiffness, joint swelling or muscle pain    Neurology:    Denies any headache or focal neurological deficits. No weakness or paresthesia. Derm:    Denies any rashes, ulcers, or excoriations. Denies bruising. Extremities:   Denies any lower extremity swelling or edema. PHYSICAL EXAM: Abnormal findings noted  VITALS:  Vitals:    02/27/22 0800   BP: (!) 143/92   Pulse: 78   Resp: 16   Temp: 97.9 °F (36.6 °C)   SpO2: 96%         CONSTITUTIONAL:    Awake, alert, cooperative, no apparent distress, and appears stated age    EYES:     EOMI, sclera clear, conjunctiva normal    ENT:    Normocephalic, atraumatic. External ears without lesions. NECK:    Supple, symmetrical, trachea midline,  no JVD    HEMATOLOGIC/LYMPHATICS:    No cervical lymphadenopathy and no supraclavicular lymphadenopathy    LUNGS:    Symmetric. No increased work of breathing, good air exchange, clear to auscultation bilaterally, no wheezes, rhonchi, or rales,     CARDIOVASCULAR:    Normal apical impulse, regular rate and rhythm, normal S1 and S2, no S3 or S4, and no murmur noted    ABDOMEN:     soft, non-distended, non-tender    MUSCULOSKELETAL:    There is no redness, warmth, or swelling of the joints. NEUROLOGIC:    Awake, alert, oriented to name, place and time. SKIN:    No bruising or bleeding. No redness, warmth, or swelling    EXTREMITIES:    Peripheral pulses present. No edema, cyanosis, or swelling.     LINES/CATHETERS     LABORATORY DATA:  CBC with Differential:    Lab Results   Component Value Date    WBC 6.4 02/27/2022    RBC 5.15 02/27/2022    HGB 16.5 02/27/2022    HCT 50.6 02/27/2022     02/27/2022    MCV 98.3 02/27/2022    MCH 32.0 02/27/2022    MCHC 32.6 02/27/2022    RDW 13.0 02/27/2022    SEGSPCT 91 03/17/2013    LYMPHOPCT 16.7 02/27/2022    MONOPCT 9.5 02/27/2022    BASOPCT 0.5 02/27/2022    MONOSABS 0.61 02/27/2022    LYMPHSABS 1.07 02/27/2022    EOSABS 0.12 02/27/2022    BASOSABS 0.03 02/27/2022     CMP:    Lab Results   Component Value Date     02/27/2022    K 3.8 02/27/2022    K 3.9 02/26/2022     02/27/2022    CO2 23 02/27/2022    BUN 21 02/27/2022    CREATININE 0.9 02/27/2022    GFRAA >60 02/27/2022    LABGLOM >60 02/27/2022    GLUCOSE 109 02/27/2022    GLUCOSE 101 02/28/2012    PROT 6.6 02/27/2022    LABALBU 3.9 02/27/2022    LABALBU 4.5 02/28/2012    CALCIUM 8.9 02/27/2022    BILITOT 0.6 02/27/2022    ALKPHOS 76 02/27/2022    AST 21 02/27/2022    ALT 19 02/27/2022       ASSESSMENT/PLAN:  1. Small bowel obstruction  2. Syncope likely vasovagal  3. dissectionof superior mesenteric artery  4. Stable small inguinal hernias  5. Coronary artery calcifications are  6. Anxiety  7. COPD  8. GERD  9. Hyperlipidemia  10. Hypertension  11. Hypothyroidism  12. Psoriasis  13. Vascular dementia  14. Chronic stage I diastolic congestive heart failure  15. Mild tricuspid vegetation next      Patient admitted due to SBO. Possible gastroenteritis. NG tube placed. Placed on low intermittent suction. General surgery consulted. Patient will be placed on IV fluids. Patient did suffer syncopal events as well. Troponin stable. EKG stable. Cardiology consulted. Patient will be placed on telemetry. Home medication reviewed  Monitor heart rate, blood pressure, O2 saturation  IV fluids D5W with lactated Ringer's 100 cc an hour    CMP, CBC in a.mMarie Warner DO  11:06 AM  2/27/2022

## 2022-02-27 NOTE — H&P
Department of Internal Medicine  History and Physical    PCP: Ariana Boyd DO  Admitting Physician: Dr. Maria A Harris  Consultants: Dr. Ivana Burleson  Date of Service: 2/26/2022    CHIEF COMPLAINT:  Syncope/abdominal pain    HISTORY OF PRESENT ILLNESS:    Patient is 70-year-old male who presents to the ED due to syncopal events as well as abdominal discomfort and distention. Patient wife is present at bedside. Patient has been having mid to right upper quadrant abdominal pain for the last few weeks. However earlier today patient did not feel well. He had increased abdominal pain and fullness. He was on the toilet earlier today around 3:30 AM. At that time he did not feel well and he stated so to his wife. After mentioning that he passed out into the bathtub. He was only out for a few seconds. He came to women back to sleep. After waking up he still did not feel well. As such he did not partake in any oral intake. He did not partake in meals. He denies any fever or chills. He denies any bloody or melena. States his last bowel movement was earlier today. States it was formed and hard. Otherwise his appetite has been good. He has been experiencing increased GERD/heartburn. However he is not having any issues with emesis. . Patient does have history of cholecystectomy. He does also have history of syncope in the past and this was related to his episode of cholecystitis.     PAST MEDICAL Hx:  Past Medical History:   Diagnosis Date    Allergic rhinitis     DUE TO POLLEN     Anxiety     Arthritis     Chronic kidney disease     COPD (chronic obstructive pulmonary disease) ([address]     GERD (gastroesophageal reflux disease)     Hiatal hernia     History of cardiovascular stress test 3-18-13    nuclear exercise    Hyperlipidemia     Hypertension     Hypothyroidism     Nontoxic (diffuse) goiter     Psoriasis     Vascular dementia with depressed mood ([address] 2/21/2020    Vascular dementia with depressed mood ([address]        PAST Expenses: Not on file   Food Insecurity:     Worried About Running Out of Food in the Last Year: Not on file    Ora of Food in the Last Year: Not on file   Transportation Needs:     Lack of Transportation (Medical): Not on file    Lack of Transportation (Non-Medical): Not on file   Physical Activity:     Days of Exercise per Week: Not on file    Minutes of Exercise per Session: Not on file   Stress:     Feeling of Stress : Not on file   Social Connections:     Frequency of Communication with Friends and Family: Not on file    Frequency of Social Gatherings with Friends and Family: Not on file    Attends Congregational Services: Not on file    Active Member of [address] [place] [address] or Organizations: Not on file    Attends Club or Organization Meetings: Not on file    Marital Status: Not on file   Intimate Partner Violence:     Fear of Current or Ex-Partner: Not on file    Emotionally Abused: Not on file    Physically Abused: Not on file    Sexually Abused: Not on file   Housing Stability:     Unable to Pay for Housing in the Last Year: Not on file    Number of [place] in the Last Year: Not on file    Unstable Housing in the Last Year: Not on file       ROS: Positive in bold  General:   Denies chills, fatigue, fever, malaise, night sweats or weight loss    Psychological:   Denies anxiety, disorientation or hallucinations    ENT:    Denies epistaxis, headaches, vertigo or visual changes    Cardiovascular:   Denies any chest pain, irregular heartbeats, or palpitations. No paroxysmal nocturnal dyspnea. Respiratory:   Denies shortness of breath, coughing, sputum production, hemoptysis, or wheezing. No orthopnea. Gastrointestinal:   Denies nausea, vomiting, diarrhea, or constipation. Denies any abdominal pain. Denies change in bowel habits or stools. Genito-Urinary:    Denies any urgency, frequency, hematuria. Voiding without difficulty.     Musculoskeletal:   Denies joint pain, joint stiffness, joint swelling or muscle pain    Neurology:    Denies any headache or focal neurological deficits. No weakness or paresthesia. Derm:    Denies any rashes, ulcers, or excoriations. Denies bruising. Extremities:   Denies any lower extremity swelling or edema. PHYSICAL EXAM: Abnormal findings noted  VITALS:  Vitals:    02/26/22 2307   BP: 136/70   Pulse: 95   Resp: 16   Temp:    SpO2: 97%         CONSTITUTIONAL:    Awake, alert, cooperative, no apparent distress, and appears stated age    EYES:     EOMI, sclera clear, conjunctiva normal    ENT:    Normocephalic, atraumatic. External ears without lesions. NECK:    Supple, symmetrical, trachea midline,  no JVD    HEMATOLOGIC/LYMPHATICS:    No cervical lymphadenopathy and no supraclavicular lymphadenopathy    LUNGS:    Symmetric. No increased work of breathing, good air exchange, clear to auscultation bilaterally, no wheezes, rhonchi, or rales,     CARDIOVASCULAR:    Normal apical impulse, regular rate and rhythm, normal S1 and S2, no S3 or S4, and no murmur noted    ABDOMEN:     soft, non-distended, non-tender    MUSCULOSKELETAL:    There is no redness, warmth, or swelling of the joints. NEUROLOGIC:    Awake, alert, oriented to name, place and time. SKIN:    No bruising or bleeding. No redness, warmth, or swelling    EXTREMITIES:    Peripheral pulses present. No edema, cyanosis, or swelling.     LINES/CATHETERS     LABORATORY DATA:  CBC with Differential:    Lab Results   Component Value Date    WBC 8.7 02/26/2022    RBC 5.48 02/26/2022    HGB 17.6 02/26/2022    HCT 53.6 02/26/2022     02/26/2022    MCV 97.8 02/26/2022    MCH 32.1 02/26/2022    MCHC 32.8 02/26/2022    RDW 13.0 02/26/2022    SEGSPCT 91 03/17/2013    LYMPHOPCT 9.1 02/26/2022    MONOPCT 7.2 02/26/2022    BASOPCT 0.1 02/26/2022    MONOSABS 0.63 02/26/2022    LYMPHSABS 0.79 02/26/2022    EOSABS 0.03 02/26/2022    BASOSABS 0.01 02/26/2022     CMP:    Lab Results   Component Value Date     02/26/2022    K 3.9 02/26/2022    CL 94 02/26/2022    CO2 25 02/26/2022    BUN 22 02/26/2022    CREATININE 1.1 02/26/2022    GFRAA >60 02/26/2022    LABGLOM >60 02/26/2022    GLUCOSE 142 02/26/2022    GLUCOSE 101 02/28/2012    PROT 7.4 02/26/2022    LABALBU 4.3 02/26/2022    LABALBU 4.5 02/28/2012    CALCIUM 9.9 02/26/2022    BILITOT 0.7 02/26/2022    ALKPHOS 85 02/26/2022    AST 20 02/26/2022    ALT 17 02/26/2022       ASSESSMENT/PLAN:  1. Small bowel obstruction  2. Syncope likely vasovagal  3. dissectionof superior mesenteric artery  4. Stable small inguinal hernias  5. Coronary artery calcifications are  6. Anxiety  7. COPD  8. GERD  9. Hyperlipidemia  10. Hypertension  11. Hypothyroidism  12. Psoriasis  13. Vascular dementia  14. Chronic stage I diastolic congestive heart failure  15. Mild tricuspid vegetation next      Patient admitted due to SBO. Possible gastroenteritis. NG tube placed. Placed on low intermittent suction. General surgery consulted. Patient will be placed on IV fluids. Patient did suffer syncopal events as well. Troponin stable. EKG stable. Cardiology consulted. Patient will be placed on telemetry.     Afua Bass [place]  12:39 AM  2/27/2022    Electronically signed by Afua Bass DO on 2/27/22 at 12:39 AM EST

## 2022-02-27 NOTE — PLAN OF CARE
Problem: Skin Integrity:  Goal: Will show no infection signs and symptoms  Description: Will show no infection signs and symptoms  2/27/2022 1341 by Osvaldo Tirado  Outcome: Met This Shift     Problem: Skin Integrity:  Goal: Absence of new skin breakdown  Description: Absence of new skin breakdown  2/27/2022 1341 by Osvaldo Tirado  Outcome: Met This Shift     Problem: Pain:  Goal: Pain level will decrease  Description: Pain level will decrease  2/27/2022 1341 by Osvaldo Tirado  Outcome: Met This Shift

## 2022-02-28 LAB
ALBUMIN SERPL-MCNC: 3.4 G/DL (ref 3.5–5.2)
ALP BLD-CCNC: 64 U/L (ref 40–129)
ALT SERPL-CCNC: 16 U/L (ref 0–40)
ANION GAP SERPL CALCULATED.3IONS-SCNC: 6 MMOL/L (ref 7–16)
AST SERPL-CCNC: 17 U/L (ref 0–39)
BASOPHILS ABSOLUTE: 0.03 E9/L (ref 0–0.2)
BASOPHILS RELATIVE PERCENT: 0.5 % (ref 0–2)
BILIRUB SERPL-MCNC: 0.5 MG/DL (ref 0–1.2)
BUN BLDV-MCNC: 12 MG/DL (ref 6–23)
CALCIUM SERPL-MCNC: 8.4 MG/DL (ref 8.6–10.2)
CHLORIDE BLD-SCNC: 103 MMOL/L (ref 98–107)
CO2: 26 MMOL/L (ref 22–29)
CREAT SERPL-MCNC: 0.9 MG/DL (ref 0.7–1.2)
EOSINOPHILS ABSOLUTE: 0.17 E9/L (ref 0.05–0.5)
EOSINOPHILS RELATIVE PERCENT: 2.8 % (ref 0–6)
GFR AFRICAN AMERICAN: >60
GFR NON-AFRICAN AMERICAN: >60 ML/MIN/1.73
GLUCOSE BLD-MCNC: 115 MG/DL (ref 74–99)
HCT VFR BLD CALC: 44.6 % (ref 37–54)
HEMOGLOBIN: 14.5 G/DL (ref 12.5–16.5)
IMMATURE GRANULOCYTES #: 0.02 E9/L
IMMATURE GRANULOCYTES %: 0.3 % (ref 0–5)
INR BLD: 1.1
LYMPHOCYTES ABSOLUTE: 1.1 E9/L (ref 1.5–4)
LYMPHOCYTES RELATIVE PERCENT: 18.3 % (ref 20–42)
MCH RBC QN AUTO: 32.4 PG (ref 26–35)
MCHC RBC AUTO-ENTMCNC: 32.5 % (ref 32–34.5)
MCV RBC AUTO: 99.8 FL (ref 80–99.9)
METER GLUCOSE: 104 MG/DL (ref 74–99)
METER GLUCOSE: 113 MG/DL (ref 74–99)
METER GLUCOSE: 120 MG/DL (ref 74–99)
METER GLUCOSE: 136 MG/DL (ref 74–99)
MONOCYTES ABSOLUTE: 0.58 E9/L (ref 0.1–0.95)
MONOCYTES RELATIVE PERCENT: 9.6 % (ref 2–12)
NEUTROPHILS ABSOLUTE: 4.12 E9/L (ref 1.8–7.3)
NEUTROPHILS RELATIVE PERCENT: 68.5 % (ref 43–80)
PDW BLD-RTO: 13.1 FL (ref 11.5–15)
PLATELET # BLD: 149 E9/L (ref 130–450)
PMV BLD AUTO: 9.9 FL (ref 7–12)
POTASSIUM SERPL-SCNC: 3.5 MMOL/L (ref 3.5–5)
PROTHROMBIN TIME: 13.1 SEC (ref 9.3–12.4)
RBC # BLD: 4.47 E12/L (ref 3.8–5.8)
SODIUM BLD-SCNC: 135 MMOL/L (ref 132–146)
TOTAL PROTEIN: 6.1 G/DL (ref 6.4–8.3)
WBC # BLD: 6 E9/L (ref 4.5–11.5)

## 2022-02-28 PROCEDURE — 1200000000 HC SEMI PRIVATE

## 2022-02-28 PROCEDURE — 2580000003 HC RX 258: Performed by: INTERNAL MEDICINE

## 2022-02-28 PROCEDURE — 2580000003 HC RX 258

## 2022-02-28 PROCEDURE — 0DP0XUZ REMOVAL OF FEEDING DEVICE FROM UPPER INTESTINAL TRACT, EXTERNAL APPROACH: ICD-10-PCS | Performed by: INTERNAL MEDICINE

## 2022-02-28 PROCEDURE — 85025 COMPLETE CBC W/AUTO DIFF WBC: CPT

## 2022-02-28 PROCEDURE — 6360000002 HC RX W HCPCS: Performed by: INTERNAL MEDICINE

## 2022-02-28 PROCEDURE — C9113 INJ PANTOPRAZOLE SODIUM, VIA: HCPCS | Performed by: INTERNAL MEDICINE

## 2022-02-28 PROCEDURE — 99233 SBSQ HOSP IP/OBS HIGH 50: CPT | Performed by: SURGERY

## 2022-02-28 PROCEDURE — 85610 PROTHROMBIN TIME: CPT

## 2022-02-28 PROCEDURE — 36415 COLL VENOUS BLD VENIPUNCTURE: CPT

## 2022-02-28 PROCEDURE — 6370000000 HC RX 637 (ALT 250 FOR IP): Performed by: INTERNAL MEDICINE

## 2022-02-28 PROCEDURE — 80053 COMPREHEN METABOLIC PANEL: CPT

## 2022-02-28 PROCEDURE — 97165 OT EVAL LOW COMPLEX 30 MIN: CPT

## 2022-02-28 PROCEDURE — 82962 GLUCOSE BLOOD TEST: CPT

## 2022-02-28 PROCEDURE — 99233 SBSQ HOSP IP/OBS HIGH 50: CPT | Performed by: INTERNAL MEDICINE

## 2022-02-28 RX ADMIN — PANTOPRAZOLE SODIUM 40 MG: 40 INJECTION, POWDER, FOR SOLUTION INTRAVENOUS at 10:01

## 2022-02-28 RX ADMIN — ENOXAPARIN SODIUM 40 MG: 100 INJECTION SUBCUTANEOUS at 10:01

## 2022-02-28 RX ADMIN — SODIUM CHLORIDE, SODIUM LACTATE, POTASSIUM CHLORIDE, CALCIUM CHLORIDE AND DEXTROSE MONOHYDRATE: 5; 600; 310; 30; 20 INJECTION, SOLUTION INTRAVENOUS at 15:34

## 2022-02-28 RX ADMIN — LEVOTHYROXINE SODIUM 150 MCG: 0.15 TABLET ORAL at 05:34

## 2022-02-28 RX ADMIN — SODIUM CHLORIDE, SODIUM LACTATE, POTASSIUM CHLORIDE, CALCIUM CHLORIDE AND DEXTROSE MONOHYDRATE: 5; 600; 310; 30; 20 INJECTION, SOLUTION INTRAVENOUS at 09:42

## 2022-02-28 RX ADMIN — TAMSULOSIN HYDROCHLORIDE 0.4 MG: 0.4 CAPSULE ORAL at 10:00

## 2022-02-28 RX ADMIN — DILTIAZEM HYDROCHLORIDE 240 MG: 240 CAPSULE, COATED, EXTENDED RELEASE ORAL at 10:00

## 2022-02-28 RX ADMIN — WATER 10 ML: 1 INJECTION INTRAMUSCULAR; INTRAVENOUS; SUBCUTANEOUS at 10:01

## 2022-02-28 ASSESSMENT — PAIN SCALES - GENERAL
PAINLEVEL_OUTOF10: 0

## 2022-02-28 NOTE — CARE COORDINATION
2/28/2022 1512 CM note: No covid testing. Met with patient for transition of care needs. NG tube out. Pt resides with his wife in a duplex, first floor living, 3-4 step entry with rail. He relays he is independent with ADLs and drives. He owns a cane, no other DME. No hx HHC/SNF. PCP is Dr Josie Price and uses [facility]. Pt plans to return home at [place]. Will await PT/OT leslie Ingram RN       Advance Care Planning   Healthcare Decision Maker:    Primary Decision Maker: Abhilash Mini - [facility] - [phone]    Click here to complete Healthcare Decision Makers including selection of the Healthcare Decision Maker Relationship (ie \"Primary\").

## 2022-02-28 NOTE — PROGRESS NOTES
[address] [facility]  [facility] [place]. [place]        Date:2022                                                  Patient Name: Jonah Morales    MRN: [id number]    : 1940    Room: [address] [place] [address]      Evaluating OT: Brice Peraza OTR/L #[id number]     Referring Provider and Specific Provider Orders/Date:      22 1245  OT eval and treat  Start:  22 1245,   End:  22 1245,   ONE TIME,   Standing Count:  1 Occurrences,   R         Linden Brice,         Placement Recommendation: Home with possible home health care OT if patient is unable to meet goals        Diagnosis:   1. Generalized abdominal pain    2. Small bowel obstruction ([address]    3. Syncope and collapse    4.  Chest pain, unspecified type           Surgery: S/p NG tube placed 22, removed 22     Pertinent Medical History:       Past Medical History:   Diagnosis Date    Allergic rhinitis     DUE TO POLLEN     Anxiety     Arthritis     Chronic kidney disease     COPD (chronic obstructive pulmonary disease) (HCC)     GERD (gastroesophageal reflux disease)     Hiatal hernia     History of cardiovascular stress test 3-18-13    nuclear exercise    Hyperlipidemia     Hypertension     Hypothyroidism     Nontoxic (diffuse) goiter     Psoriasis     Vascular dementia with depressed mood ([address] 2020    Vascular dementia with depressed mood ([address]          Past Surgical History:   Procedure Laterality Date    CHOLECYSTECTOMY      COLONOSCOPY  2012    ECHO COMPL W DOP COLOR FLOW  3/18/2013         ENDOSCOPY, COLON, DIAGNOSTIC      EYE SURGERY      CATARACT    TONSILLECTOMY          Precautions:  Fall Risk      Assessment of current deficits    [x] Functional mobility  [x]ADLs  [x] Strength               [x]Cognition    [x] Functional transfers   [x] IADLs         [x] Safety Awareness   [x]Endurance    [] Fine lower body clothing?: A Little  How much help for Bathing?: A Little  How much help for Toileting?: A Little  How much help for putting on and taking off regular upper body clothing?: A Little  How much help for taking care of personal grooming?: A Little  How much help for eating meals?: None  AM-PAC Inpatient Daily Activity Raw Score: 19  AM-PAC Inpatient ADL T-Scale Score : 40.22  ADL Inpatient CMS 0-100% Score: 42.8  ADL Inpatient CMS G-Code Modifier : CK     Functional Assessment:    Initial Eval Status  Date: 2/28/22   Treatment Status  Date: STGs = LTGs  Time frame: 10-14 days   Feeding Independent     Independent    Grooming Stand by Assist/Set-up    Independent    UB Dressing Stand by Assist    Independent    LB Dressing Stand by Assist   To doff/don socks seated at EOB    Independent    Bathing Minimal Assist   Discussed DME benefits for improved safety with bathing     Independent    Toileting Minimal Assist     Independent    Bed Mobility  Supine to sit: Supervision   Sit to supine: Supervision       Supine to sit: Independent   Sit to supine: Independent    Functional Transfers Sit to stand: SBA  Stand to sit: SBA  Commode Transfer: SBA     Independent    Functional Mobility Supervision without AD to/from bathroom.     Independent    Balance Sitting:     Static: SBA    Dynamic: SBA  Standing: SBA     Sitting:     Static: Indep    Dynamic: Indep  Standing: Indep    Activity Tolerance fair  plus   Increase standing tolerance >3 minutes for improved engagement with functional transfers and indep in ADLs     Visual/  Perceptual Glasses: Yes    Reports changes in vision since admission: No      NA      Hand Dominance: Right     AROM (PROM) Strength Additional Info:    RUE  WFL 4-/5 good  and wfl FMC/dexterity noted during ADL tasks     LUE WFL 4-/5 good  and wfl FMC/dexterity noted during ADL tasks       Hearing:  WFL   Sensation:   No c/o numbness or tingling  Tone:  WFL   Edema:     Comments: RN cleared patient for OT. Upon arrival patient in supine, wife at bedside. Therapist facilitated and instructed pt on adapted  techniques & compensatory strategies to improve safety and independence with basic ADLs, bed mobility, functional transfers and mobility to allow pt to achieve highest level of independence and safely. Pt demonstrated fair plus understanding of education & follow through. At end of session, patient was in supine, chair position, with call light and phone within reach, all lines and tubes intact. Overall, patient demonstrated  decreased independence and safety during completion of ADL tasks. Pt would benefit from continued skilled OT to increase safety and independence with completion of ADL tasks and functional mobility for improved quality of life. Rehab Potential: Good for established goals. Patient / Family Goal: Return home      Patient and/or family were instructed on functional diagnosis, prognosis/goals and OT plan of care. Demonstrated fair plus/good understanding. Eval Complexity: Low    Time In: 3:30 PM   Time Out: 3:50 PM    Total Treatment Time: 0       Min Units   OT Eval Low 97165  X  1    OT Eval Medium 35421      OT Eval High 76893      OT Re-Eval [id number]            ADL/Self Care [id number]     Therapeutic Activities 38578       Therapeutic Ex 36298       Orthotic Management 42795       Manual [id number]     Neuro Re-Ed 26909       Non-Billable Time        Evaluation Time additionally includes thorough review of current medical information, gathering information on past medical history/social history and prior level of function, interpretation of standardized testing/informal observation of tasks, assessment of data and development of plan of care and goals.         Evaluating OT: Rock Stein OTR/L #[id number]

## 2022-02-28 NOTE — PROGRESS NOTES
Patient denies any abdominal pain or nausea vomiting. Patient still has some abdominal bloating. He has not had a bowel movement but he said he just passed a little bit of gas. BUN/creatinine was 12/0.9. Blood sugars range 106-136. WBC 6.0 with hemoglobin 14.5. Temperature is 97.6 with a heart rate 82 blood pressure 159/96. O2 sat 94% on room air at rest.  Cardiology note reviewed. NG tube is clamped at this time and the patient was started on a clear liquid diet. PAST MEDICAL Hx:  Past Medical History:   Diagnosis Date    Allergic rhinitis     DUE TO POLLEN     Anxiety     Arthritis     Chronic kidney disease     COPD (chronic obstructive pulmonary disease) ([address]     GERD (gastroesophageal reflux disease)     Hiatal hernia     History of cardiovascular stress test 3-18-13    nuclear exercise    Hyperlipidemia     Hypertension     Hypothyroidism     Nontoxic (diffuse) goiter     Psoriasis     Vascular dementia with depressed mood ([facility] 2/21/2020    Vascular dementia with depressed mood ([facility]        PAST SURGICAL Hx:   Past Surgical History:   Procedure Laterality Date    CHOLECYSTECTOMY      COLONOSCOPY  12/13/2012    ECHO COMPL W DOP COLOR FLOW  3/18/2013         ENDOSCOPY, COLON, DIAGNOSTIC      EYE SURGERY      CATARACT    TONSILLECTOMY         FAMILY Hx:  Family History   Problem Relation Age of Onset    Diabetes Mother     Hearing Loss Brother        HOME MEDICATIONS:  Prior to Admission medications    Medication Sig Start Date End Date Taking?  Authorizing Provider   dilTIAZem (CARDIZEM CD) 240 MG extended release capsule Take 1 capsule by mouth daily 11/22/21  Yes Reta Mathis DO   levothyroxine (SYNTHROID) 150 MCG tablet Take 1 tablet by mouth Daily 11/22/21  Yes Reta Mathis DO   losartan (COZAAR) 100 MG tablet Take 1 tablet by mouth daily 11/22/21  Yes Reta Mathis DO   omeprazole (PRILOSEC) 40 MG delayed release capsule Take 1 capsule by mouth daily 11/22/21 Yes Avelino Laughter, DO   tamsulosin (FLOMAX) 0.4 MG capsule  9/7/21  Yes Historical Provider, MD   fluocinonide (LIDEX) 0.05 % external solution APPLY TO BUMPS ON SCALP TWICE DAILY AS NEEDED 6/3/21  Yes Historical Provider, MD   Multiple Vitamins-Minerals (CENTRUM SILVER ULTRA MENS) TABS Take 1 tablet by mouth daily   Yes Historical Provider, MD       ALLERGIES:  Patient has no known allergies. SOCIAL Hx:  Social History     Socioeconomic History    Marital status:      Spouse name: Not on file    Number of children: Not on file    Years of education: Not on file    Highest education level: Not on file   Occupational History    Not on file   Tobacco Use    Smoking status: Never Smoker    Smokeless tobacco: Never Used   Substance and Sexual Activity    Alcohol use: No     Comment: occasionally    Drug use: No    Sexual activity: Not Currently   Other Topics Concern    Not on file   Social History Narrative    Not on file     Social Determinants of Health     Financial Resource Strain:     Difficulty of Paying Living Expenses: Not on file   Food Insecurity:     Worried About Running Out of Food in the Last Year: Not on file    Ora of Food in the Last Year: Not on file   Transportation Needs:     Lack of Transportation (Medical): Not on file    Lack of Transportation (Non-Medical):  Not on file   Physical Activity:     Days of Exercise per Week: Not on file    Minutes of Exercise per Session: Not on file   Stress:     Feeling of Stress : Not on file   Social Connections:     Frequency of Communication with Friends and Family: Not on file    Frequency of Social Gatherings with Friends and Family: Not on file    Attends Gnosticist Services: Not on file    Active Member of Clubs or Organizations: Not on file    Attends Club or Organization Meetings: Not on file    Marital Status: Not on file   Intimate Partner Violence:     Fear of Current or Ex-Partner: Not on file   Cy Gardner Emotionally Abused: Not on file    Physically Abused: Not on file    Sexually Abused: Not on file   Housing Stability:     Unable to Pay for Housing in the Last Year: Not on file    Number of Places Lived in the Last Year: Not on file    Unstable Housing in the Last Year: Not on file       ROS: Positive in bold  General:   Denies chills, fatigue, fever, malaise, night sweats or weight loss    Psychological:   Denies anxiety, disorientation or hallucinations    ENT:    Denies epistaxis, headaches, vertigo or visual changes    Cardiovascular:   Denies any chest pain, irregular heartbeats, or palpitations. No paroxysmal nocturnal dyspnea. Respiratory:   Denies shortness of breath, coughing, sputum production, hemoptysis, or wheezing. No orthopnea. Gastrointestinal:   Denies nausea, vomiting, diarrhea, or constipation. Denies any abdominal pain. Denies change in bowel habits or stools. Genito-Urinary:    Denies any urgency, frequency, hematuria. Voiding without difficulty. Musculoskeletal:   Denies joint pain, joint stiffness, joint swelling or muscle pain    Neurology:    Denies any headache or focal neurological deficits. No weakness or paresthesia. Derm:    Denies any rashes, ulcers, or excoriations. Denies bruising. Extremities:   Denies any lower extremity swelling or edema. PHYSICAL EXAM: Abnormal findings noted  VITALS:  Vitals:    02/28/22 1000   BP: (!) 159/96   Pulse: 82   Resp: 18   Temp: 97.6 °F (36.4 °C)   SpO2: 94%         CONSTITUTIONAL:    Awake, alert, cooperative, no apparent distress, and appears stated age    EYES:     EOMI, sclera clear, conjunctiva normal    ENT:    Normocephalic, atraumatic. External ears without lesions. NECK:    Supple, symmetrical, trachea midline,  no JVD    HEMATOLOGIC/LYMPHATICS:    No cervical lymphadenopathy and no supraclavicular lymphadenopathy    LUNGS:    Symmetric.  No increased work of breathing, good air exchange, clear to auscultation bilaterally, no wheezes, rhonchi, or rales,     CARDIOVASCULAR:    Normal apical impulse, regular rate and rhythm, normal S1 and S2, no S3 or S4, and no murmur noted    ABDOMEN:     soft, non-distended, non-tender    MUSCULOSKELETAL:    There is no redness, warmth, or swelling of the joints. NEUROLOGIC:    Awake, alert, oriented to name, place and time. SKIN:    No bruising or bleeding. No redness, warmth, or swelling    EXTREMITIES:    Peripheral pulses present. No edema, cyanosis, or swelling. LINES/CATHETERS     LABORATORY DATA:  CBC with Differential:    Lab Results   Component Value Date    WBC 6.0 02/28/2022    RBC 4.47 02/28/2022    HGB 14.5 02/28/2022    HCT 44.6 02/28/2022     02/28/2022    MCV 99.8 02/28/2022    MCH 32.4 02/28/2022    MCHC 32.5 02/28/2022    RDW 13.1 02/28/2022    SEGSPCT 91 03/17/2013    LYMPHOPCT 18.3 02/28/2022    MONOPCT 9.6 02/28/2022    BASOPCT 0.5 02/28/2022    MONOSABS 0.58 02/28/2022    LYMPHSABS 1.10 02/28/2022    EOSABS 0.17 02/28/2022    BASOSABS 0.03 02/28/2022     CMP:    Lab Results   Component Value Date     02/28/2022    K 3.5 02/28/2022    K 3.9 02/26/2022     02/28/2022    CO2 26 02/28/2022    BUN 12 02/28/2022    CREATININE 0.9 02/28/2022    GFRAA >60 02/28/2022    LABGLOM >60 02/28/2022    GLUCOSE 115 02/28/2022    GLUCOSE 101 02/28/2012    PROT 6.1 02/28/2022    LABALBU 3.4 02/28/2022    LABALBU 4.5 02/28/2012    CALCIUM 8.4 02/28/2022    BILITOT 0.5 02/28/2022    ALKPHOS 64 02/28/2022    AST 17 02/28/2022    ALT 16 02/28/2022       ASSESSMENT/PLAN:  1. Small bowel obstruction  2. Syncope likely vasovagal  3. dissectionof superior mesenteric artery  4. Stable small inguinal hernias  5. Coronary artery calcifications are  6. Anxiety  7. COPD  8. GERD  9. Hyperlipidemia  10. Hypertension  11. Hypothyroidism  12. Psoriasis  13. Vascular dementia  14. Chronic stage I diastolic congestive heart failure  15.  Mild tricuspid vegetation next      Patient admitted due to SBO. Possible gastroenteritis. NG tube placed. Placed on low intermittent suction. General surgery consulted. Patient will be placed on IV fluids. Patient did suffer syncopal events as well. Troponin stable. EKG stable. Cardiology consulted. Patient will be placed on telemetry. Home medication reviewed  Monitor heart rate, blood pressure, O2 saturation  IV fluids D5W with lactated Ringer's 100 cc an hour    NG tube clamped 2/28  Clear liquid started  Increase activity    CBC in a.m.     Syl Kinney DO  12:12 PM  2/28/2022

## 2022-02-28 NOTE — PROGRESS NOTES
INPATIENT CARDIOLOGY CONSULT follow-up visit    Name: Tamy Mcallister    Age: 80 y.o. Date of Admission: 2/26/2022  9:00 PM    Date of Service: 2/28/2022    Reason for Consultation: Syncope    Chief Complaint   Patient presents with    Abdominal Pain     since yesterday morning         Referring Physician: Sherman Dietrich DO    Subjective: Denies chest pain or shortness of breath.   NG tube removed            Past Medical History:  Past Medical History:   Diagnosis Date    Allergic rhinitis     DUE TO POLLEN     Anxiety     Arthritis     Chronic kidney disease     COPD (chronic obstructive pulmonary disease) (HCC)     GERD (gastroesophageal reflux disease)     Hiatal hernia     History of cardiovascular stress test 3-18-13    nuclear exercise    Hyperlipidemia     Hypertension     Hypothyroidism     Nontoxic (diffuse) goiter     Psoriasis     Vascular dementia with depressed mood ([facility] [address] 2/21/2020    Vascular dementia with depressed mood ([facility])        Review of Systems:   Cardiac: As per HPI  General: Denies fever or chills  Pulmonary: As per HPI  HEENT: Denies runny nose  GI: No complaints  : No complaints  Endocrine: Denies night sweats  Musculoskeletal: No complaints  Skin: Dry skin  Neuro: No complaints  Psych: Denies depression      Past Surgical History:  Past Surgical History:   Procedure Laterality Date    CHOLECYSTECTOMY      COLONOSCOPY  12/13/2012    ECHO COMPL W DOP COLOR FLOW  3/18/2013         ENDOSCOPY, COLON, DIAGNOSTIC      EYE SURGERY      CATARACT    TONSILLECTOMY         Family History:  Family History   Problem Relation Age of Onset    Diabetes Mother     Hearing Loss Brother        Social History:  Social History     Socioeconomic History    Marital status:      Spouse name: Not on file    Number of children: Not on file    Years of education: Not on file    Highest education level: Not on file   Occupational History    Not on file   Tobacco Use    Smoking status: Never Smoker    Smokeless tobacco: Never Used   Substance and Sexual Activity    Alcohol use: No     Comment: occasionally    Drug use: No    Sexual activity: Not Currently   Other Topics Concern    Not on file   Social History Narrative    Not on file     Social Determinants of Health     Financial Resource Strain:     Difficulty of Paying Living Expenses: Not on file   Food Insecurity:     Worried About Running Out of Food in the Last Year: Not on file    Ora of Food in the Last Year: Not on file   Transportation Needs:     Lack of Transportation (Medical): Not on file    Lack of Transportation (Non-Medical): Not on file   Physical Activity:     Days of Exercise per Week: Not on file    Minutes of Exercise per Session: Not on file   Stress:     Feeling of Stress : Not on file   Social Connections:     Frequency of Communication with Friends and Family: Not on file    Frequency of Social Gatherings with Friends and Family: Not on file    Attends Orthodox Services: Not on file    Active Member of [address] [place] [address] or Organizations: Not on file    Attends Club or Organization Meetings: Not on file    Marital Status: Not on file   Intimate Partner Violence:     Fear of Current or Ex-Partner: Not on file    Emotionally Abused: Not on file    Physically Abused: Not on file    Sexually Abused: Not on file   Housing Stability:     Unable to Pay for Housing in the Last Year: Not on file    Number of [place] in the Last Year: Not on file    Unstable Housing in the Last Year: Not on file       Allergies:  No Known Allergies    Home Medications:  Prior to Admission medications    Medication Sig Start Date End Date Taking?  Authorizing Provider   dilTIAZem (CARDIZEM CD) 240 MG extended release capsule Take 1 capsule by mouth daily 11/22/21  Yes Newton Hinojosa DO   levothyroxine (SYNTHROID) 150 MCG tablet Take 1 tablet by mouth Daily 11/22/21  Yes Newton Hinojosa DO   losartan (COZAAR) 100 MG tablet Take 1 tablet by mouth daily 11/22/21  Yes Griffin Merlin, DO   omeprazole (PRILOSEC) 40 MG delayed release capsule Take 1 capsule by mouth daily 11/22/21  Yes Griffin Merlin, DO   tamsulosin [facility]) 0.4 MG capsule  9/7/21  Yes Historical Provider, MD   fluocinonide (LIDEX) 0.05 % external solution APPLY TO BUMPS ON SCALP TWICE DAILY AS NEEDED 6/3/21  Yes Historical Provider, MD   Multiple Vitamins-Minerals (CENTRUM SILVER ULTRA MENS) TABS Take 1 tablet by mouth daily   Yes Historical Provider, MD       Current Medications:  Current Facility-Administered Medications   Medication Dose Route Frequency Provider Last Rate Last Admin    glucose (GLUTOSE) 40 % oral gel 15 g  15 g Oral PRN Lucia Finely, DO        glucagon (rDNA) injection 1 mg  1 mg IntraMUSCular PRN Lucia Finely, DO        dextrose 5 % solution  100 mL/hr IntraVENous PRN Lucia Finely, DO        sodium chloride flush 0.9 % injection 5-40 mL  5-40 mL IntraVENous 2 times per day Lucia Finely, DO        sodium chloride flush 0.9 % injection 5-40 mL  5-40 mL IntraVENous PRN Lucia Finely, DO        0.9 % sodium chloride infusion  25 mL IntraVENous PRN Lucia Finely, DO        enoxaparin (LOVENOX) injection 40 mg  40 mg SubCUTAneous Daily Lucia Finely, DO   40 mg at 02/28/22 1001    polyethylene glycol (GLYCOLAX) packet 17 g  17 g Oral Daily PRN Lucia Finely, DO        acetaminophen (TYLENOL) tablet 650 mg  650 mg Oral Q6H PRN Lucia Finely, DO        Or    acetaminophen (TYLENOL) suppository 650 mg  650 mg Rectal Q6H PRN Lucia Finely, DO        dextrose bolus (hypoglycemia) 10% 125 mL  125 mL IntraVENous PRN Luica Finely, DO        Or    dextrose bolus (hypoglycemia) 10% 250 mL  250 mL IntraVENous PRN Lucia Finely, DO        levothyroxine (SYNTHROID) tablet 150 mcg  150 mcg Oral Daily Lucia Finely, DO   150 mcg at 02/28/22 0534    tamsulosin (FLOMAX) capsule 0.4 mg  0.4 mg Oral Daily Estella Breed, DO   0.4 mg at 02/28/22 1000    promethazine (PHENERGAN) injection 6.25 mg  6.25 mg IntraMUSCular Q6H PRN Estella Breed, DO        pantoprazole (PROTONIX) injection 40 mg  40 mg IntraVENous Daily Estella Mares, DO   40 mg at 02/28/22 1001    dextrose 5 % in lactated ringers infusion   IntraVENous Continuous Thersia Heckler, DO 90 mL/hr at 02/28/22 1534 New Bag at 02/28/22 1534    dilTIAZem (CARDIZEM CD) extended release capsule 240 mg  240 mg Oral Daily Estella Mares, DO   240 mg at 02/28/22 1000      dextrose      sodium chloride      dextrose 5% in lactated ringers 90 mL/hr at 02/28/22 1534       Physical Exam:  BP (!) 159/96   Pulse 82   Temp 97.6 °F (36.4 °C) (Oral)   Resp 18   Ht 5' 8\" (1.727 m)   Wt 195 lb (88.5 kg)   SpO2 94%   BMI 29.65 kg/m²   Wt Readings from Last 3 Encounters:   02/27/22 195 lb (88.5 kg)   11/22/21 202 lb 1.6 oz (91.7 kg)   09/10/21 200 lb 4.8 oz (90.9 kg)       Appearance: Alert and oriented x3 not in acute distress.   Skin: Dry skin  Head: Atraumatic  Eyes: Intact extraocular muscles   ENMT: Mucous membranes are moist  Neck: Supple  Lungs: Clear to auscultation  Cardiac: Normal S1 and S2  Abdomen: Protuberant  Extremities: Intact range of motion  Neurologic: No focal neurological deficits  Peripheral Pulses: 2+ peripheral pulses    Intake/Output:    Intake/Output Summary (Last 24 hours) at 2/28/2022 1657  Last data filed at 2/28/2022 1501  Gross per 24 hour   Intake 2942.52 ml   Output 1300 ml   Net 1642.52 ml     I/O this shift:  In: 2942.5 [P.O.:180; I.V.:2762.5]  Out: 1050 [Urine:850; Emesis/NG output:200]    Laboratory Tests:  Recent Labs     02/26/22  2129 02/27/22  0503 02/28/22  0504    137 135   K 3.9 3.8 3.5   CL 94* 103 103   CO2 25 23 26   BUN 22 21 12   CREATININE 1.1 0.9 0.9   GLUCOSE 142* 109* 115*   CALCIUM 9.9 8.9 8.4*     Lab Results   Component Value Date    MG 2.0 02/27/2022    MG 2.0 03/18/2013    MG 2.2 07/30/2012 Recent Labs     02/26/22 2129 02/27/22  0503 02/28/22  0504   ALKPHOS 85 76 64   ALT 17 19 16   AST 20 21 17   PROT 7.4 6.6 6.1*   BILITOT 0.7 0.6 0.5   BILIDIR <0.2  --   --    LABALBU 4.3 3.9 3.4*     Recent Labs     02/26/22 2129 02/27/22  0503 02/28/22  0504   WBC 8.7 6.4 6.0   RBC 5.48 5.15 4.47   HGB 17.6* 16.5 14.5   HCT 53.6 50.6 44.6   MCV 97.8 98.3 99.8   MCH 32.1 32.0 32.4   MCHC 32.8 32.6 32.5   RDW 13.0 13.0 13.1    167 149   MPV 9.4 10.1 9.9     Lab Results   Component Value Date    CKTOTAL 84 03/18/2013    CKMB 0.8 03/18/2013    TROPONINI <0.01 03/18/2013    TROPONINI <0.01 03/17/2013     Recent Labs     02/26/22 2129 02/26/22  2230   TROPHS 12* 12*     Lab Results   Component Value Date    INR 1.1 02/28/2022    PROTIME 13.1 (H) 02/28/2022     Lab Results   Component Value Date    TSH 1.540 02/27/2022    TSH 2.110 11/22/2021    TSH 1.610 11/11/2020     Lab Results   Component Value Date    LABA1C 5.4 11/22/2021    LABA1C 5.6 11/11/2020    LABA1C 5.6 09/12/2019     No results found for: EAG  Lab Results   Component Value Date    CHOL 190 11/22/2021    CHOL 176 11/11/2020    CHOL 194 09/12/2019     Lab Results   Component Value Date    TRIG 107 11/22/2021    TRIG 105 11/11/2020    TRIG 107 09/12/2019     Lab Results   Component Value Date    HDL 54 11/22/2021    HDL 49 11/11/2020    HDL 52 09/12/2019     Lab Results   Component Value Date    LDLCALC 115 (H) 11/22/2021    LDLCALC 106 (H) 11/11/2020    LDLCALC 121 (H) 09/12/2019     Lab Results   Component Value Date    LABVLDL 21 11/22/2021    LABVLDL 21 11/11/2020    LABVLDL 21 09/12/2019    VLDL 17 03/30/2018     Lab Results   Component Value Date    CHOLHDLRATIO 3.40 03/30/2018     Recent Labs     02/26/22  2129   PROBNP 67       Cardiac Tests:  EKG reviewed (EKG date: Sinus rhythm, 99 bpm, anterolateral Q waves and inferior Q waves and right bundle branch block):        Echocardiogram reviewed: March 2013  Summary    Left ventricular size and wall motion normal, EF 65%. Mild LVH. Stage I diastolic dysfunction. There is trace to mild tricuspid regurgitation. No evidence of pericardial effusion. Stress test reviewed: March 2013  IMPRESSION-     1. Unremarkable study.           Ejection fraction-       SPECT gated images of the left ventricular myocardium were obtained for   purposes of left ventricular ejection fraction determination.  Left   ventricular ejection fraction is calculated at 66%.         IMPRESSION-    1.  LVEF 66%.             Cardiac catheterization reviewed:     CXR reviewed: The ASCVD Risk score (José Luis Batista, et al., 2013) failed to calculate for the following reasons: The 2013 ASCVD risk score is only valid for ages 36 to 78    ASSESSMENT / PLAN:    1. Generalized abdominal pain  Management per primary and GI  Report improvement and NG tube removed  Awaiting EGD  2. Small bowel obstruction (HCC)  Management per primary and GI  3. Syncope and collapse  Etiology likely vasovagal as wife report patient was on the toilet at the time this happened and in the same time he was having significant abdominal pain  Obtain updated echocardiogram to guide therapy  He has history of nonsustained SVT and occasional PVCs and will benefit from outpatient Zio patch heart monitor if no arrhythmias are found during hospitalization  4. Chest pain, unspecified type  Obtain echocardiogram to guide therapy  5. History of nonsustained SVT and occasional PVCs as well as right bundle branch block  Monitor on telemetry  5. COPD  6. GERD  7. Hiatal hernia  8. Hypertension  9. Hyperlipidemia  10. Hypothyroidism  11. Psoriasis  12. Vascular dementia        Thank you for allowing me to participate in your patient's care. Please feel free to contact me if you have any questions or concerns.     Demetria Cevallos MD  [facility]) Cardiology

## 2022-03-01 VITALS
HEIGHT: 68 IN | OXYGEN SATURATION: 95 % | BODY MASS INDEX: 28.72 KG/M2 | HEART RATE: 68 BPM | TEMPERATURE: 98.8 F | DIASTOLIC BLOOD PRESSURE: 72 MMHG | WEIGHT: 189.5 LBS | RESPIRATION RATE: 16 BRPM | SYSTOLIC BLOOD PRESSURE: 150 MMHG

## 2022-03-01 LAB
ANION GAP SERPL CALCULATED.3IONS-SCNC: 8 MMOL/L (ref 7–16)
BUN BLDV-MCNC: 6 MG/DL (ref 6–23)
CALCIUM SERPL-MCNC: 8.6 MG/DL (ref 8.6–10.2)
CHLORIDE BLD-SCNC: 103 MMOL/L (ref 98–107)
CO2: 25 MMOL/L (ref 22–29)
CREAT SERPL-MCNC: 0.8 MG/DL (ref 0.7–1.2)
EKG ATRIAL RATE: 99 BPM
EKG P AXIS: 70 DEGREES
EKG P-R INTERVAL: 152 MS
EKG Q-T INTERVAL: 354 MS
EKG QRS DURATION: 114 MS
EKG QTC CALCULATION (BAZETT): 454 MS
EKG R AXIS: -95 DEGREES
EKG T AXIS: 40 DEGREES
EKG VENTRICULAR RATE: 99 BPM
GFR AFRICAN AMERICAN: >60
GFR NON-AFRICAN AMERICAN: >60 ML/MIN/1.73
GLUCOSE BLD-MCNC: 125 MG/DL (ref 74–99)
METER GLUCOSE: 104 MG/DL (ref 74–99)
METER GLUCOSE: 121 MG/DL (ref 74–99)
POTASSIUM SERPL-SCNC: 3.8 MMOL/L (ref 3.5–5)
SODIUM BLD-SCNC: 136 MMOL/L (ref 132–146)
URINE CULTURE, ROUTINE: NORMAL

## 2022-03-01 PROCEDURE — C9113 INJ PANTOPRAZOLE SODIUM, VIA: HCPCS | Performed by: INTERNAL MEDICINE

## 2022-03-01 PROCEDURE — 97161 PT EVAL LOW COMPLEX 20 MIN: CPT | Performed by: PHYSICAL THERAPIST

## 2022-03-01 PROCEDURE — 80048 BASIC METABOLIC PNL TOTAL CA: CPT

## 2022-03-01 PROCEDURE — 6370000000 HC RX 637 (ALT 250 FOR IP): Performed by: INTERNAL MEDICINE

## 2022-03-01 PROCEDURE — 99233 SBSQ HOSP IP/OBS HIGH 50: CPT | Performed by: SURGERY

## 2022-03-01 PROCEDURE — 2580000003 HC RX 258: Performed by: INTERNAL MEDICINE

## 2022-03-01 PROCEDURE — 82962 GLUCOSE BLOOD TEST: CPT

## 2022-03-01 PROCEDURE — 36415 COLL VENOUS BLD VENIPUNCTURE: CPT

## 2022-03-01 PROCEDURE — 6360000002 HC RX W HCPCS: Performed by: INTERNAL MEDICINE

## 2022-03-01 RX ADMIN — TAMSULOSIN HYDROCHLORIDE 0.4 MG: 0.4 CAPSULE ORAL at 08:40

## 2022-03-01 RX ADMIN — LEVOTHYROXINE SODIUM 150 MCG: 0.15 TABLET ORAL at 05:27

## 2022-03-01 RX ADMIN — SODIUM CHLORIDE, PRESERVATIVE FREE 10 ML: 5 INJECTION INTRAVENOUS at 08:41

## 2022-03-01 RX ADMIN — PANTOPRAZOLE SODIUM 40 MG: 40 INJECTION, POWDER, FOR SOLUTION INTRAVENOUS at 08:40

## 2022-03-01 RX ADMIN — ENOXAPARIN SODIUM 40 MG: 100 INJECTION SUBCUTANEOUS at 08:40

## 2022-03-01 RX ADMIN — DILTIAZEM HYDROCHLORIDE 240 MG: 240 CAPSULE, COATED, EXTENDED RELEASE ORAL at 08:40

## 2022-03-01 ASSESSMENT — PAIN SCALES - GENERAL: PAINLEVEL_OUTOF10: 0

## 2022-03-01 NOTE — PROGRESS NOTES
Physical Therapy Initial Evaluation/Plan of Care    Room #:  5962/8945-84  Patient Name: Eduardo Lockwood  YOB: 1940  MRN: [id number]    Date of Service: 3/1/2022     Tentative placement recommendation: Home Health Physical Therapy if needed  Equipment recommendation: None      Evaluating Physical Therapist: Mendel Sinks, PT #[id number]      Specific Provider Orders/Date/Referring Provider :  02/28/22 1245   PT eval and treat Start: 02/28/22 1245, End: 02/28/22 1245, ONE TIME, Standing Count: 1 Occurrences, R    Dearl DO [place]      Admitting Diagnosis:   Syncope and collapse [R55]  Small bowel obstruction ([address] [K56.609]  SBO (small bowel obstruction) ([address] [K56.609]  Generalized abdominal pain [R10.84]  Chest pain, unspecified type [R07.9]     nausea, diaphoresis, epigastric abdominal discomfort,    Surgery:     Visit Diagnoses       Codes    Generalized abdominal pain    -  Primary R10.84    Small bowel obstruction ([address]     K56.609    Syncope and collapse     R55    Chest pain, unspecified type     R07.9          Patient Active Problem List   Diagnosis    HTN (hypertension)    Over weight    PSVT (paroxysmal supraventricular tachycardia) ([address]    PVC's (premature ventricular contractions)    Gastroesophageal reflux disease    SBO (small bowel obstruction) ([address]        ASSESSMENT of Current Deficits Patient exhibits decreased balance and endurance impairing functional mobility, transfers, gait , gait distance and tolerance to activity decreased poncho. Patient requires skilled physical therapy to address concerns listed above to increase safety and independence at discharge.          PHYSICAL THERAPY  PLAN OF CARE       Physical therapy plan of care is established based on physician order,  patient diagnosis and clinical assessment    Current Treatment Recommendations:    -Standing Balance: Perform strengthening exercises in standing to promote motor control with or without upper extremity support   -Transfers: Provide instruction on proper hand and foot position for adequate transfer of weight onto lower extremities and use of gait device if needed and Cues for hand placement, technique and safety. Provide stabilization to prevent fall   -Gait: Gait training, Standing activities to improve: base of support, weight shift, weight bearing  and Pregait training to emphasize: increased Romina and Safety   -Endurance: Utilize Supervised activities to increase level of endurance to allow for safe functional mobility including transfers and gait   -Stairs: Stair training with instruction on proper technique and hand placement on rail    PT long term treatment goals are located in below grid    Patient and or family understand(s) diagnosis, prognosis, and plan of care. Frequency of treatments: Patient will be seen  2-3 times/week. Nursing to ambulate patient every shift. Prior Level of Function: Patient ambulated independently drives   Rehab Potential: good    for baseline    Past medical history:   Past Medical History:   Diagnosis Date    Allergic rhinitis     DUE TO POLLEN     Anxiety     Arthritis     Chronic kidney disease     COPD (chronic obstructive pulmonary disease) (HCC)     GERD (gastroesophageal reflux disease)     Hiatal hernia     History of cardiovascular stress test 3-18-13    nuclear exercise    Hyperlipidemia     Hypertension     Hypothyroidism     Nontoxic (diffuse) goiter     Psoriasis     Vascular dementia with depressed mood ([facility] 2/21/2020    Vascular dementia with depressed mood ([facility]      Past Surgical History:   Procedure Laterality Date    CHOLECYSTECTOMY      COLONOSCOPY  12/13/2012    ECHO COMPL W DOP COLOR FLOW  3/18/2013         ENDOSCOPY, COLON, DIAGNOSTIC      EYE SURGERY      CATARACT    TONSILLECTOMY         SUBJECTIVE:    Precautions:  Up with assistance, falls ,    Social history: Patient lives with spouse in a duplex  with 3-4 steps  to enter with Brittney in shower grab bars    Equipment owned: [employer] Romero, [address] and Elevated toilet seat,       [id number] [facility] Mobility Inpatient   How much difficulty turning over in bed?: None  How much difficulty sitting down on / standing up from a chair with arms?: A Little  How much difficulty moving from lying on back to sitting on side of bed?: None  How much help from another person moving to and from a bed to a chair?: A Little  How much help from another person needed to walk in hospital room?: A Little  How much help from another person for climbing 3-5 steps with a railing?: A Little  AM-PAC Inpatient Mobility Raw Score : 20  AM-PAC Inpatient T-Scale Score : 47.67  Mobility Inpatient CMS 0-100% Score: 35.83  Mobility Inpatient CMS G-Code Modifier : [address] cleared patient for PT evaluation. The admitting diagnosis and active problem list as listed above have been reviewed prior to the initiation of this evaluation. OBJECTIVE;   Initial Evaluation  Date: 3/1/2022 Treatment Date:     Short Term/ Long Term   Goals   Was pt agreeable to Eval/treatment? Yes  To be met in 2 days   Pain level   0/10        Bed Mobility    Rolling: Independent    Supine to sit: Independent    Sit to supine: Not assessed     Scooting: Independent      Rolling: Independent    Supine to sit:  Independent    Sit to supine: Independent    Scooting: Independent     Transfers Sit to stand: Supervision  Cues for hand placement and safety   Sit to stand: Independent     Ambulation    2 x 110 feet using  pushing IV pole first rep, no device 2nd rep with Supervision    for balance and cues for safety and pacing   150 feet using  no device with Independent    Stair negotiation: ascended and descended   Not assessed     4 steps 1 rail with supervision    ROM Within functional limits    Increase range of motion 10% of affected joints    Strength BUE:  refer to OT eval  RLE:  4-/5  LLE:  4-/5  Increase strength

## 2022-03-01 NOTE — CARE COORDINATION
3/1/2022 0945 CM note: No covid testing. Follow up visit made to patient. He relays he ate a regular diet this am and had BM this am. Pt plans to return home at NH, his wife will provide ride.   Juan Jose CANSECO

## 2022-03-01 NOTE — DISCHARGE SUMMARY
Department of Internal Medicine      PCP: Ariana Boyd DO  Admitting Physician: Dr. Maria A Harris  Consultants: Dr. Ivana Burleson  Date of Service: 2/26/2022    CHIEF COMPLAINT:  Syncope/abdominal pain    HISTORY OF PRESENT ILLNESS:    Patient is 69-year-old male who presents to the ED due to syncopal events as well as abdominal discomfort and distention. Patient wife is present at bedside. Patient has been having mid to right upper quadrant abdominal pain for the last few weeks. However earlier today patient did not feel well. He had increased abdominal pain and fullness. He was on the toilet earlier today around 3:30 AM. At that time he did not feel well and he stated so to his wife. After mentioning that he passed out into the bathtub. He was only out for a few seconds. He came to women back to sleep. After waking up he still did not feel well. As such he did not partake in any oral intake. He did not partake in meals. He denies any fever or chills. He denies any bloody or melena. States his last bowel movement was earlier today. States it was formed and hard. Otherwise his appetite has been good. He has been experiencing increased GERD/heartburn. However he is not having any issues with emesis. . Patient does have history of cholecystectomy. He does also have history of syncope in the past and this was related to his episode of cholecystitis. 2/27/2022  Patient seen examined on telemetry floor. Patient's wife at the bedside and case discussed. The patient denies any abdominal pain at this time. There is no nausea vomiting. Patient has NG tube in place. BUN/creatinine 21/0.9. WBC 6.4 with hemoglobin 16.5. Temperature is 97.9 with heart rate 70 and blood pressure 143/92. O2 sat 96% room air at rest.  General surgery note reviewed. Routine labs in a.m. IV fluids lactated Ringer's 90 cc an hour. 2/28/2022  Patient seen examined on telemetry floor. Patient's wife is at the bedside and case discussed. Patient denies any abdominal pain or nausea vomiting. Patient still has some abdominal bloating. He has not had a bowel movement but he said he just passed a little bit of gas. BUN/creatinine was 12/0.9. Blood sugars range 106-136. WBC 6.0 with hemoglobin 14.5. Temperature is 97.6 with a heart rate 82 blood pressure 159/96. O2 sat 94% on room air at rest.  Cardiology note reviewed. NG tube is clamped at this time and the patient was started on a clear liquid diet. 3/1/2022  Patient seen examined on telemetry floor. Patient states he feels back to his baseline. Patient ate a regular breakfast and did have a bowel movement this morning. Patient's wife at the bedside and the case discussed. BUN/creatinine was 6/0.8. Blood sugars range 104-136. Temperature 98.8 with a heart rate of 68 blood pressure 150/72. O2 sat 95% room air at rest.  Patient will be discharged home today. Patient instructed to continue stool softeners and laxatives at home.     Patient stable for discharge    PAST MEDICAL Hx:  Past Medical History:   Diagnosis Date    Allergic rhinitis     DUE TO POLLEN     Anxiety     Arthritis     Chronic kidney disease     COPD (chronic obstructive pulmonary disease) (HCC)     GERD (gastroesophageal reflux disease)     Hiatal hernia     History of cardiovascular stress test 3-18-13    nuclear exercise    Hyperlipidemia     Hypertension     Hypothyroidism     Nontoxic (diffuse) goiter     Psoriasis     Vascular dementia with depressed mood ([facility] 2/21/2020    Vascular dementia with depressed mood (HCC)        PAST SURGICAL Hx:   Past Surgical History:   Procedure Laterality Date    CHOLECYSTECTOMY      COLONOSCOPY  12/13/2012    ECHO COMPL W DOP COLOR FLOW  3/18/2013         ENDOSCOPY, COLON, DIAGNOSTIC      EYE SURGERY      CATARACT    TONSILLECTOMY         FAMILY Hx:  Family History   Problem Relation Age of Onset    Diabetes Mother     Hearing Loss Brother        HOME Minutes of Exercise per Session: Not on file   Stress:     Feeling of Stress : Not on file   Social Connections:     Frequency of Communication with Friends and Family: Not on file    Frequency of Social Gatherings with Friends and Family: Not on file    Attends Shinto Services: Not on file    Active Member of Clubs or Organizations: Not on file    Attends Club or Organization Meetings: Not on file    Marital Status: Not on file   Intimate Partner Violence:     Fear of Current or Ex-Partner: Not on file    Emotionally Abused: Not on file    Physically Abused: Not on file    Sexually Abused: Not on file   Housing Stability:     Unable to Pay for Housing in the Last Year: Not on file    Number of [place] in the Last Year: Not on file    Unstable Housing in the Last Year: Not on file       ROS: Positive in bold  General:   Denies chills, fatigue, fever, malaise, night sweats or weight loss    Psychological:   Denies anxiety, disorientation or hallucinations    ENT:    Denies epistaxis, headaches, vertigo or visual changes    Cardiovascular:   Denies any chest pain, irregular heartbeats, or palpitations. No paroxysmal nocturnal dyspnea. Respiratory:   Denies shortness of breath, coughing, sputum production, hemoptysis, or wheezing. No orthopnea. Gastrointestinal:   Denies nausea, vomiting, diarrhea, or constipation. Denies any abdominal pain. Denies change in bowel habits or stools. Genito-Urinary:    Denies any urgency, frequency, hematuria. Voiding without difficulty. Musculoskeletal:   Denies joint pain, joint stiffness, joint swelling or muscle pain    Neurology:    Denies any headache or focal neurological deficits. No weakness or paresthesia. Derm:    Denies any rashes, ulcers, or excoriations. Denies bruising. Extremities:   Denies any lower extremity swelling or edema.       PHYSICAL EXAM: Abnormal findings noted  VITALS:  Vitals:    03/01/22 0745   BP: (!) 150/72 Pulse: 68   Resp: 16   Temp: 98.8 °F (37.1 °C)   SpO2: 95%         CONSTITUTIONAL:    Awake, alert, cooperative, no apparent distress, and appears stated age    EYES:     EOMI, sclera clear, conjunctiva normal    ENT:    Normocephalic, atraumatic. External ears without lesions. NECK:    Supple, symmetrical, trachea midline,  no JVD    HEMATOLOGIC/LYMPHATICS:    No cervical lymphadenopathy and no supraclavicular lymphadenopathy    LUNGS:    Symmetric. No increased work of breathing, good air exchange, clear to auscultation bilaterally, no wheezes, rhonchi, or rales,     CARDIOVASCULAR:    Normal apical impulse, regular rate and rhythm, normal S1 and S2, no S3 or S4, and no murmur noted    ABDOMEN:     soft, non-distended, non-tender    MUSCULOSKELETAL:    There is no redness, warmth, or swelling of the joints. NEUROLOGIC:    Awake, alert, oriented to name, place and time. SKIN:    No bruising or bleeding. No redness, warmth, or swelling    EXTREMITIES:    Peripheral pulses present. No edema, cyanosis, or swelling.     LINES/CATHETERS     LABORATORY DATA:  CBC with Differential:    Lab Results   Component Value Date    WBC 6.0 02/28/2022    RBC 4.47 02/28/2022    HGB 14.5 02/28/2022    HCT 44.6 02/28/2022     02/28/2022    MCV 99.8 02/28/2022    MCH 32.4 02/28/2022    MCHC 32.5 02/28/2022    RDW 13.1 02/28/2022    SEGSPCT 91 03/17/2013    LYMPHOPCT 18.3 02/28/2022    MONOPCT 9.6 02/28/2022    BASOPCT 0.5 02/28/2022    MONOSABS 0.58 02/28/2022    LYMPHSABS 1.10 02/28/2022    EOSABS 0.17 02/28/2022    BASOSABS 0.03 02/28/2022     CMP:    Lab Results   Component Value Date     03/01/2022    K 3.8 03/01/2022    K 3.9 02/26/2022     03/01/2022    CO2 25 03/01/2022    BUN 6 03/01/2022    CREATININE 0.8 03/01/2022    GFRAA >60 03/01/2022    LABGLOM >60 03/01/2022    GLUCOSE 125 03/01/2022    GLUCOSE 101 02/28/2012    PROT 6.1 02/28/2022    LABALBU 3.4 02/28/2022    LABALBU 4.5 02/28/2012 CALCIUM 8.6 03/01/2022    BILITOT 0.5 02/28/2022    ALKPHOS 64 02/28/2022    AST 17 02/28/2022    ALT 16 02/28/2022       ASSESSMENT/PLAN:  1. Acute small bowel obstruction  2. Syncope likely vasovagal  3. 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 Stable small inguinal hernias  5. Coronary artery calcifications   6. Anxiety  7. COPD  8. GERD  9. Hyperlipidemia  10. Hypertension  11. Hypothyroidism  12. Psoriasis  13. Vascular dementia  14. Chronic stage I diastolic congestive heart failure  15.  Mild tricuspid vegetation next      Plan:  Discharge home today    Continue home meds    Patient will follow up with general surgery-Dr. Chelsi Weinberg in about 1 week and patient is getting set up for outpatient EGD    Over-the-counter stool softeners on a daily basis    Follow-up with primary care physician in 1 week or as directed    Granville Boast, DO  12:14 PM  3/1/2022

## 2022-03-01 NOTE — PROGRESS NOTES
GENERAL SURGERY  DAILY PROGRESS NOTE  3/1/2022    Subjective:  Patient is feeling well this morning, abdominal pain resolved with gastric decompression, NG was removed yesterday, no nausea or vomiting, tolerated regular diet. Positive flatus yesterday and a BM this morning. Objective:  BP (!) 145/87   Pulse 78   Temp 97.5 °F (36.4 °C) (Oral)   Resp 18   Ht 5' 8\" (1.727 m)   Wt 189 lb 8 oz (86 kg)   SpO2 95%   BMI 28.81 kg/m²     GENERAL:  Laying in bed, awake, alert, cooperative, no apparent distress  LUNGS:  No increased work of breathing  CARDIOVASCULAR:  RR  ABDOMEN:  Soft, non-tender. Mildly distended, no rebound or guarding  EXTREMITIES: No edema or swelling  SKIN: Warm and dry    Assessment:  80 y.o. male admitted with nausea from gastric distention likely secondary to gastroenteritis, no nausea or abdominal pain since the stomach was decompressed with an NG tube     Plan:  Regular diet  Ok to discharge, follow up next week.   Will need EGD as outpatient    Electronically signed by Brittney Cox MD,  Enrique Rios MD on 3/1/2022 at 6:44 AM

## 2022-03-01 NOTE — PROGRESS NOTES
Attempted to call Dr. Julia Brannon office to make a follow up appointment, but no one answered the office.

## 2022-03-03 ENCOUNTER — TELEPHONE (OUTPATIENT)
Dept: CARDIOLOGY CLINIC | Age: 82
End: 2022-03-03

## 2022-03-04 ENCOUNTER — INITIAL CONSULT (OUTPATIENT)
Dept: SURGERY | Age: 82
End: 2022-03-04
Payer: MEDICARE

## 2022-03-04 ENCOUNTER — TELEPHONE (OUTPATIENT)
Dept: SURGERY | Age: 82
End: 2022-03-04

## 2022-03-04 VITALS
HEIGHT: 68 IN | SYSTOLIC BLOOD PRESSURE: 150 MMHG | DIASTOLIC BLOOD PRESSURE: 81 MMHG | BODY MASS INDEX: 28.64 KG/M2 | TEMPERATURE: 97.3 F | HEART RATE: 63 BPM | WEIGHT: 189 LBS

## 2022-03-04 DIAGNOSIS — K56.609 SBO (SMALL BOWEL OBSTRUCTION) (HCC): Primary | ICD-10-CM

## 2022-03-04 PROCEDURE — 99214 OFFICE O/P EST MOD 30 MIN: CPT | Performed by: SURGERY

## 2022-03-04 RX ORDER — SODIUM CHLORIDE, SODIUM LACTATE, POTASSIUM CHLORIDE, CALCIUM CHLORIDE 600; 310; 30; 20 MG/100ML; MG/100ML; MG/100ML; MG/100ML
INJECTION, SOLUTION INTRAVENOUS CONTINUOUS
Status: CANCELLED | OUTPATIENT
Start: 2022-03-04

## 2022-03-04 NOTE — TELEPHONE ENCOUNTER
Prior Authorization Form:      DEMOGRAPHICS:                     Patient Name:  Lamar [place]  Patient :  1940            Insurance:  Payor: [place] / Plan: Mobile-XL CHOICE HMO / Product Type: *No Product type* /   Insurance ID Number:    Payor/Plan Subscr  Sex Relation Sub. Ins. ID Effective Group Num   1.  [address] 1940 Male Self [id number]                                    [address]         DIAGNOSIS & PROCEDURE:                       Procedure/Operation: EGD        CPT Code: 48879    Diagnosis:  Small Bowel Obstruction    ICD10 Code: C17.206    Location:  [address] [place] [address],[address]    Surgeon:  Dr. Taras Reddy INFORMATION:                          Date: 22  Time: TBD               Anesthesia:  MAC/TIVA                                                       Status:  Outpatient        Special Comments:         Electronically signed by Fraser Schirmer, MA on 3/4/2022 at 12:49 PM

## 2022-03-04 NOTE — PROGRESS NOTES
Daysi Pepe  3/4/2022      [facility] Office Consult    HISTORY OF PRESENT ILLNESS:  Daysi Pepe is a 80 y.o.  male in the hospital 2/27/22 with nausea and abdominal distension. Bowel moved before coming to the hospital. CT abdomen showed massively distended stomach with fluid in the entire small bowel and colon, read as possible obstruction or enteritis. NG tube placed with 700 cc returned, pain and nausea resolved completely. Past Medical History: He has a past medical history of Allergic rhinitis, Anxiety, Arthritis, Chronic kidney disease, COPD (chronic obstructive pulmonary disease) ([facility] [address] GERD (gastroesophageal reflux disease), Hiatal hernia, History of cardiovascular stress test, Hyperlipidemia, Hypertension, Hypothyroidism, Nontoxic (diffuse) goiter, Psoriasis, Vascular dementia with depressed mood ([address] and Vascular dementia with depressed mood ([facility] [address] Past Surgical History: He has a past surgical history that includes Cholecystectomy; Tonsillectomy; Endoscopy, colon, diagnostic; ECHO Compl W Dop Color Flow (3/18/2013); Colonoscopy (12/13/2012); and eye surgery. Home Medications  Prior to Visit Medications    Medication Sig Taking?  Authorizing Provider   dilTIAZem (CARDIZEM CD) 240 MG extended release capsule Take 1 capsule by mouth daily Yes Avelino Laughter, DO   levothyroxine (SYNTHROID) 150 MCG tablet Take 1 tablet by mouth Daily Yes Avelino Laughter, DO   losartan (COZAAR) 100 MG tablet Take 1 tablet by mouth daily Yes Avelino Laughter, DO   omeprazole (PRILOSEC) 40 MG delayed release capsule Take 1 capsule by mouth daily Yes Avelino Laughter, DO   fluocinonide (LIDEX) 0.05 % external solution APPLY TO BUMPS ON SCALP TWICE DAILY AS NEEDED Yes Historical Provider, MD   tamsulosin (FLOMAX) 0.4 MG capsule   Historical Provider, MD   Multiple Vitamins-Minerals (CENTRUM SILVER ULTRA MENS) TABS Take 1 tablet by mouth daily  Historical Provider, MD       Allergies: Patient has no known allergies. Social History:   TOBACCO:   reports that he has never smoked. He has never used smokeless tobacco.  All smokers should join the free smoking cessation program and stop smoking before having surgery. ETOH:    reports no history of alcohol use. Problem Relation Age of Onset    Diabetes Mother     Hearing Loss Brother        Review of Systems:  Psychiatric:  depression and anxiety  Respiratory: negative  Cardiovascular: negative  Gastrointestinal: negative  Musculoskeletal:negative  All others reviewed, negative    Physical Exam:   VITALS: Blood pressure (!) 150/81, pulse 63, temperature 97.3 °F (36.3 °C), temperature source Temporal, height 5' 8\" (1.727 m), weight 189 lb (85.7 kg). General appearance: alert, appears stated age and cooperative, does ambulate easily  Head: Normocephalic, without obvious abnormality, atraumatic  Eyes: PERRL  Ears/mouth/throat:  Ears clear, mouth normal, throat no redness  Neck: no adenopathy, no JVD, supple, symmetrical, trachea midline and thyroid not enlarged  Lungs: clear to auscultation bilaterally  Heart: regular rate and rhythm  Abdomen: soft, non-tender; bowel sounds normal; no masses,  no organomegaly  Extremities: extremities normal, atraumatic, no cyanosis or edema  Skin: no open wounds    ASSESSMENT:   gastric swelling seen on CT scan  Short of breath cutting grass or shoveling snow for the past few year. PLAN: EGD    Signed: Dr. Song Vieyra M.D.     Send copy of consult to PCP, Latosha Parker,  room air

## 2022-03-04 NOTE — TELEPHONE ENCOUNTER
Follow-up in 4 to 12 weeks. Follow-up sooner if there are symptoms. If symptoms are significant please go to emergency room. Also, please follow-up with your primary care doctor.

## 2022-03-04 NOTE — TELEPHONE ENCOUNTER
Per the order of Dr. Shubham Nance, patient has been scheduled for EGD on 03/09/22. Patient instructed to please contact our office with any questions. Procedure scheduled through [facility]. Dr. Shubham Nance to enter orders.

## 2022-03-08 NOTE — PROGRESS NOTES
[address]                                                                                                                     PRE OP INSTRUCTIONS FOR  Cathleen Muller        Date: 3/8/2022    Date and time of surgery: 3/9/22  Arrival Time: [facility] will call with time between 4:30-5. 1. Do not eat or drink anything after 12 midnight prior to surgery. This includes no water, chewing gum, mints or ice chips. 2. Take the following pills with a small sip of water on the morning of Surgery: Levothyroxine and Diltiazem       3. Diabetics may take evening dose of insulin but none after midnight. If you feel symptomatic or low blood sugar take 1-2 ounces of apple juice only. 4. Aspirin, Ibuprofen, Advil, Naproxen, Vitamin E and other Anti-inflammatory products should be stopped  before surgery  as directed by your physician. 5. Check with your Doctor regarding stopping Plavix, Coumadin, Lovenox, Fragmin or other blood thinners. 6. Do not smoke,use illicit drugs and do not drink any alcoholic beverages 24 hours prior to surgery. 7. You may brush your teeth and gargle the morning of surgery. DO NOT SWALLOW WATER    8. You MUST make arrangements for a responsible adult to take you home after your surgery. You will not be allowed to leave alone or drive yourself home. It is strongly suggested someone stay with you the first 24 hrs. Your surgery will be cancelled if you do not have a ride home. 9. A parent/legal guardian must accompany a child scheduled for surgery and plan to stay at the hospital until the child is discharged. Please do not bring other children with you. 10. Please wear simple, loose fitting clothing to the hospital.  She Uriarte not bring valuables (money, credit cards, checkbooks, etc.) Do not wear any makeup (including no eye makeup) or nail polish on your fingers or toes. 11. DO NOT wear any jewelry or piercings on day of surgery.   All body piercing jewelry must be removed. 12. Shower the night before surgery with _X__Antibacterial soap ___Hibiclens. 15. Remember to bring Blood Bank bracelet to the hospital on the day of surgery. 14. If you have a Living Will and Durable Power of  for Healthcare, please bring in a copy. 15. If appropriate bring crutches, inspirex, etc... 12. Notify your Surgeon if you develop any illness between now and surgery time, cough, cold, fever, sore throat, nausea, vomiting, etc.  Please notify your surgeon if you experience dizziness, shortness of breath or blurred vision between now & the time of your surgery. 17. If you have ___dentures, they will be removed before going to the OR; we will provide you a container. If you wear ___contact lenses or _X__glasses, they will be removed; please bring a case for them. 18. To provide excellent care visitors will be limited to one in the room at any given time. 19. Please bring picture ID and insurance card. 20. Sleep apnea patients need to bring CPAP to hospital on day of surgery. 21. Visit our web site for additional information: [email or web address]    22. During flu season no children under the age of 15 are permitted in the hospital for the safety of all patients. 23. Other Come in through main lobby, go to information desk. Please call pre admission testing if you have any further questions.    [address] [facility]     [address]

## 2022-03-09 ENCOUNTER — HOSPITAL ENCOUNTER (OUTPATIENT)
Age: 82
Setting detail: OUTPATIENT SURGERY
Discharge: HOME HEALTH | End: 2022-03-09
Attending: SURGERY | Admitting: SURGERY
Payer: MEDICARE

## 2022-03-09 ENCOUNTER — ANESTHESIA EVENT (OUTPATIENT)
Dept: ENDOSCOPY | Age: 82
End: 2022-03-09
Payer: MEDICARE

## 2022-03-09 ENCOUNTER — ANESTHESIA (OUTPATIENT)
Dept: ENDOSCOPY | Age: 82
End: 2022-03-09
Payer: MEDICARE

## 2022-03-09 VITALS
WEIGHT: 191 LBS | RESPIRATION RATE: 16 BRPM | SYSTOLIC BLOOD PRESSURE: 122 MMHG | TEMPERATURE: 97.2 F | OXYGEN SATURATION: 98 % | HEIGHT: 68 IN | BODY MASS INDEX: 28.95 KG/M2 | HEART RATE: 50 BPM | DIASTOLIC BLOOD PRESSURE: 68 MMHG

## 2022-03-09 VITALS
SYSTOLIC BLOOD PRESSURE: 122 MMHG | OXYGEN SATURATION: 97 % | RESPIRATION RATE: 15 BRPM | DIASTOLIC BLOOD PRESSURE: 67 MMHG

## 2022-03-09 PROCEDURE — 87081 CULTURE SCREEN ONLY: CPT

## 2022-03-09 PROCEDURE — 43239 EGD BIOPSY SINGLE/MULTIPLE: CPT | Performed by: SURGERY

## 2022-03-09 PROCEDURE — 7100000010 HC PHASE II RECOVERY - FIRST 15 MIN: Performed by: SURGERY

## 2022-03-09 PROCEDURE — 2709999900 HC NON-CHARGEABLE SUPPLY: Performed by: SURGERY

## 2022-03-09 PROCEDURE — 6360000002 HC RX W HCPCS: Performed by: NURSE ANESTHETIST, CERTIFIED REGISTERED

## 2022-03-09 PROCEDURE — 3700000000 HC ANESTHESIA ATTENDED CARE: Performed by: SURGERY

## 2022-03-09 PROCEDURE — 3700000001 HC ADD 15 MINUTES (ANESTHESIA): Performed by: SURGERY

## 2022-03-09 PROCEDURE — 88305 TISSUE EXAM BY PATHOLOGIST: CPT

## 2022-03-09 PROCEDURE — 2580000003 HC RX 258: Performed by: NURSE ANESTHETIST, CERTIFIED REGISTERED

## 2022-03-09 PROCEDURE — 7100000011 HC PHASE II RECOVERY - ADDTL 15 MIN: Performed by: SURGERY

## 2022-03-09 PROCEDURE — 2580000003 HC RX 258: Performed by: SURGERY

## 2022-03-09 PROCEDURE — 3609012400 HC EGD TRANSORAL BIOPSY SINGLE/MULTIPLE: Performed by: SURGERY

## 2022-03-09 PROCEDURE — 2500000003 HC RX 250 WO HCPCS: Performed by: NURSE ANESTHETIST, CERTIFIED REGISTERED

## 2022-03-09 RX ORDER — SODIUM CHLORIDE, SODIUM LACTATE, POTASSIUM CHLORIDE, CALCIUM CHLORIDE 600; 310; 30; 20 MG/100ML; MG/100ML; MG/100ML; MG/100ML
INJECTION, SOLUTION INTRAVENOUS CONTINUOUS PRN
Status: DISCONTINUED | OUTPATIENT
Start: 2022-03-09 | End: 2022-03-09 | Stop reason: SDUPTHER

## 2022-03-09 RX ORDER — PROPOFOL 10 MG/ML
INJECTION, EMULSION INTRAVENOUS PRN
Status: DISCONTINUED | OUTPATIENT
Start: 2022-03-09 | End: 2022-03-09 | Stop reason: SDUPTHER

## 2022-03-09 RX ORDER — LIDOCAINE HYDROCHLORIDE 20 MG/ML
INJECTION, SOLUTION EPIDURAL; INFILTRATION; INTRACAUDAL; PERINEURAL PRN
Status: DISCONTINUED | OUTPATIENT
Start: 2022-03-09 | End: 2022-03-09 | Stop reason: SDUPTHER

## 2022-03-09 RX ORDER — SODIUM CHLORIDE, SODIUM LACTATE, POTASSIUM CHLORIDE, CALCIUM CHLORIDE 600; 310; 30; 20 MG/100ML; MG/100ML; MG/100ML; MG/100ML
INJECTION, SOLUTION INTRAVENOUS CONTINUOUS
Status: DISCONTINUED | OUTPATIENT
Start: 2022-03-09 | End: 2022-03-09 | Stop reason: HOSPADM

## 2022-03-09 RX ADMIN — PROPOFOL 50 MG: 10 INJECTION, EMULSION INTRAVENOUS at 11:14

## 2022-03-09 RX ADMIN — PROPOFOL 30 MG: 10 INJECTION, EMULSION INTRAVENOUS at 11:16

## 2022-03-09 RX ADMIN — LIDOCAINE HYDROCHLORIDE 120 MG: 20 INJECTION, SOLUTION EPIDURAL; INFILTRATION; INTRACAUDAL; PERINEURAL at 11:14

## 2022-03-09 RX ADMIN — SODIUM CHLORIDE, POTASSIUM CHLORIDE, SODIUM LACTATE AND CALCIUM CHLORIDE: 600; 310; 30; 20 INJECTION, SOLUTION INTRAVENOUS at 10:36

## 2022-03-09 RX ADMIN — SODIUM CHLORIDE, POTASSIUM CHLORIDE, SODIUM LACTATE AND CALCIUM CHLORIDE: 600; 310; 30; 20 INJECTION, SOLUTION INTRAVENOUS at 11:10

## 2022-03-09 RX ADMIN — PROPOFOL 50 MG: 10 INJECTION, EMULSION INTRAVENOUS at 11:15

## 2022-03-09 ASSESSMENT — PAIN - FUNCTIONAL ASSESSMENT: PAIN_FUNCTIONAL_ASSESSMENT: 0-10

## 2022-03-09 NOTE — OP NOTE
[address]    Operative Report    DATE OF PROCEDURE: 3/9/2022    SURGEON: Dr. Maritza Gerber MD     Surgical Assistant: Ulysses Harpin Simkins, RN     PREOPERATIVE DIAGNOSES:      HTN (hypertension)     PVC's (premature ventricular contractions)     Gastroesophageal reflux disease     POSTOPERATIVE DIAGNOSES:    EGD 3/9/2022 showed no gastric mass or ulcers, mild gastritis, no duodenitis, mild esophagitis, 4 cm hiatal hernia with a moderate pouch of stomach above the diaphragm. OPERATION:    EGD with biopsy    ANESTHESIA: LMAC. BLOOD LOSS: none  SPECIMEN: gastric antrum    CONSENT AND INDICATIONS:  This is a 80y.o. year old male who had gastric wall swelling seen on CT scan, but no hiatal hernia seen, needs an EGD as part of the work up. I have discussed with the patient the indication, alternatives, and the possible risks and/or complications of the planned procedure and the anesthesia methods. The patient and/or patient representative appear to understand and agree to proceed. Monitoring and Safety: Anaesthesia monitored vital signs, BP cuff, pulse oximetry, cardiac monitor and level of consciousness until recovered. OPERATIONS: The patient was placed on the table and sedated by anaesthesia. Bite block was placed. A lubricated scope was easily passed into the upper esophagus which looked normal. The distal esophagus had mild esophagitis with signs of a moderate hiatal hernia. The scope was passed into the stomach and retroflexed. There was a 4 cm hiatal hernia. The scope was passed down toward the pylorus. The antral mucosa had mild inflammation but no ulcers or masses. Biopsy was taken twice to check for pathology and H. pylori. The scope was passed through the pylorus into the duodenal bulb which was normal, then around to the distal duodenum which looked normal, and the scope was withdrawn. The patient tolerated the procedure well.     Plan:

## 2022-03-09 NOTE — ANESTHESIA PRE PROCEDURE
Department of Anesthesiology  Preprocedure Note       Name:  Perla Ayala   Age:  80 y.o.  :  1940                                          MRN:  [id number]         Date:  3/9/2022      Surgeon: Ritchie Boateng):  Sia Marrufo MD    Procedure: Procedure(s):  EGD ESOPHAGOGASTRODUODENOSCOPY    Medications prior to admission:   Prior to Admission medications    Medication Sig Start Date End Date Taking?  Authorizing Provider   Glucosamine-Chondroit-Vit C-Mn (GLUCOSAMINE 1500 COMPLEX PO) Take by mouth   Yes Historical Provider, MD   dilTIAZem (CARDIZEM CD) 240 MG extended release capsule Take 1 capsule by mouth daily 21  Yes Winter Fernandez DO   levothyroxine (SYNTHROID) 150 MCG tablet Take 1 tablet by mouth Daily 21  Yes Winter Fernandez DO   losartan (COZAAR) 100 MG tablet Take 1 tablet by mouth daily 21  Yes Winter Fernandez DO   omeprazole (PRILOSEC) 40 MG delayed release capsule Take 1 capsule by mouth daily 21  Yes Winter Fernandez DO   tamsulosin [facility]) 0.4 MG capsule  21  Yes Historical Provider, MD   fluocinonide (LIDEX) 0.05 % external solution APPLY TO BUMPS ON SCALP TWICE DAILY AS NEEDED 6/3/21  Yes Historical Provider, MD   Multiple Vitamins-Minerals (CENTRUM SILVER ULTRA MENS) TABS Take 1 tablet by mouth daily   Yes Historical Provider, MD       Current medications:    Current Facility-Administered Medications   Medication Dose Route Frequency Provider Last Rate Last Admin    lactated ringers infusion   IntraVENous Continuous Sia Marrufo  mL/hr at 22 1036 New Bag at 22 1036       Allergies:  No Known Allergies    Problem List:    Patient Active Problem List   Diagnosis Code    HTN (hypertension) I10    Over weight E66.3    PSVT [facility]oxysmal supraventricular tachycardia) ([facility]) I47.1    PVC's (premature ventricular contractions) I49.3    Gastroesophageal reflux disease K21.9    SBO (small bowel obstruction) ([facility] K56.609       Past Medical History:        Diagnosis Date    Allergic rhinitis     DUE TO POLLEN     Anxiety     Arthritis     Chronic kidney disease     COPD (chronic obstructive pulmonary disease) (HCC)     GERD (gastroesophageal reflux disease)     Hiatal hernia     History of cardiovascular stress test 3-18-13    nuclear exercise    Hyperlipidemia     Hypertension     Hypothyroidism     Nontoxic (diffuse) goiter     Psoriasis     Vascular dementia with depressed mood ([facility] 2/21/2020    [facility]        Past Surgical History:        Procedure Laterality Date    CHOLECYSTECTOMY      COLONOSCOPY  12/13/2012    ECHO COMPL W DOP COLOR FLOW  3/18/2013         ENDOSCOPY, COLON, DIAGNOSTIC      EYE SURGERY      CATARACT    TONSILLECTOMY         Social History:    Social History     Tobacco Use    Smoking status: Never Smoker    Smokeless tobacco: Never Used   Substance Use Topics    Alcohol use: No     Comment: occasionally                                Counseling given: Not Answered      Vital Signs (Current):   Vitals:    03/09/22 1011   BP: 130/68   Pulse: 64   Resp: 18   Temp: 97.3 °F (36.3 °C)   SpO2: 100%   Weight: 191 lb (86.6 kg)   Height: 5' 8\" (1.727 m)                                              BP Readings from Last 3 Encounters:   03/09/22 130/68   03/04/22 (!) 150/81   03/01/22 (!) 150/72       NPO Status: Time of last liquid consumption: 1900                        Time of last solid consumption: 1800                        Date of last liquid consumption: 03/08/22                        Date of last solid food consumption: 03/08/22    BMI:   Wt Readings from Last 3 Encounters:   03/09/22 191 lb (86.6 kg)   03/04/22 189 lb (85.7 kg)   03/01/22 189 lb 8 oz (86 kg)     Body mass index is 29.04 kg/m².     CBC:   Lab Results   Component Value Date    WBC 6.0 02/28/2022    RBC 4.47 02/28/2022    HGB 14.5 02/28/2022    HCT 44.6 02/28/2022    MCV 99.8 02/28/2022    RDW 13.1 02/28/2022     02/28/2022       CMP:   Lab Results   Component Value Date     03/01/2022    K 3.8 03/01/2022    K 3.9 02/26/2022     03/01/2022    CO2 25 03/01/2022    BUN 6 03/01/2022    CREATININE 0.8 03/01/2022    GFRAA >60 03/01/2022    LABGLOM >60 03/01/2022    GLUCOSE 125 03/01/2022    GLUCOSE 101 02/28/2012    PROT 6.1 02/28/2022    CALCIUM 8.6 03/01/2022    BILITOT 0.5 02/28/2022    ALKPHOS 64 02/28/2022    AST 17 02/28/2022    ALT 16 02/28/2022       POC Tests: No results for input(s): POCGLU, POCNA, POCK, POCCL, POCBUN, POCHEMO, POCHCT in the last 72 hours. Coags:   Lab Results   Component Value Date    PROTIME 13.1 02/28/2022    INR 1.1 02/28/2022       HCG (If Applicable): No results found for: PREGTESTUR, PREGSERUM, HCG, HCGQUANT     ABGs: No results found for: PHART, PO2ART, TDN6XAI, DQX3OCJ, BEART, Q1LPZIBJ     Type & Screen (If Applicable):  No results found for: LABABO, LABRH    Drug/Infectious Status (If Applicable):  No results found for: HIV, HEPCAB    COVID-19 Screening (If Applicable): No results found for: COVID19        Anesthesia Evaluation  Patient summary reviewed  Airway:         Dental:          Pulmonary:   (+) COPD:                            ROS comment: Allergic Rhinitis. Cardiovascular:    (+) hypertension:, hyperlipidemia         Beta Blocker:  Not on Beta Blocker         Neuro/Psych:   (+) CVA:, psychiatric history:depression/anxiety             GI/Hepatic/Renal:   (+) hiatal hernia, GERD:, renal disease:,           Endo/Other:    (+) hypothyroidism: arthritis:., .                  ROS comment: Non Toxic Goiter. Psoriasis. Abdominal:             Vascular: negative vascular ROS. Other Findings:             Anesthesia Plan      MAC     ASA 4       Induction: intravenous. Anesthetic plan and risks discussed with patient. Plan discussed with CRNA.     Attending anesthesiologist reviewed and agrees with Preprocedure content              Lona Torre Ham Green MD   3/9/2022

## 2022-03-09 NOTE — ANESTHESIA POSTPROCEDURE EVALUATION
Department of Anesthesiology  Postprocedure Note    Patient: Elvira Bryant  MRN: [id number]  YOB: 1940  Date of evaluation: 3/9/2022  Time:  11:56 AM     Procedure Summary     Date: 22 Room / Location: [address] [place] [address] / [address] [place] [address]    Anesthesia Start: 1110 Anesthesia Stop: 3827    Procedure: EGD BIOPSY (N/A ) Diagnosis: (SMALL BOWEL OBSTRUCTION)    Surgeons: Wesley Kimble MD Responsible Provider: Orlando Adams MD    Anesthesia Type: MAC ASA Status: 4          Anesthesia Type: MAC    Jodee Phase I: Jodee Score: 10    Jodee Phase II:      Last vitals: Reviewed and per EMR flowsheets.        Anesthesia Post Evaluation    Patient location during evaluation: PACU  Patient participation: complete - patient participated  Level of consciousness: awake  Pain score: 0  Airway patency: patent  Nausea & Vomiting: no nausea  Complications: no  Cardiovascular status: blood pressure returned to baseline  Respiratory status: acceptable  Hydration status: euvolemic

## 2022-03-09 NOTE — H&P
Lamar Duckworth  3/9/2022      H&P    HISTORY OF PRESENT ILLNESS:  Lamar Duckworth is a 80 y.o.  male in the hospital 2/27/22 with nausea and abdominal distension. Bowel moved before coming to the hospital. CT abdomen showed massively distended stomach with fluid in the entire small bowel and colon, read as possible obstruction or enteritis. NG tube placed with 700 cc returned, pain and nausea resolved completely, bowels moved and he was discharged, told he needed an upper scope in the future. Past Medical History: He has a past medical history of Allergic rhinitis, Anxiety, Arthritis, Chronic kidney disease, COPD (chronic obstructive pulmonary disease) ([facility] GERD (gastroesophageal reflux disease), Hiatal hernia, History of cardiovascular stress test, Hyperlipidemia, Hypertension, Hypothyroidism, Nontoxic (diffuse) goiter, Psoriasis, Vascular dementia with depressed mood ([facility] [address] [facility] and Vascular dementia with depressed mood ([facility] Past Surgical History: He has a past surgical history that includes Cholecystectomy; Tonsillectomy; Endoscopy, colon, diagnostic; ECHO Compl W Dop Color Flow (3/18/2013); Colonoscopy (12/13/2012); and eye surgery. Home Medications  Prior to Visit Medications    Medication Sig Taking?  Authorizing Provider   Glucosamine-Chondroit-Vit C-Mn (GLUCOSAMINE 1500 COMPLEX PO) Take by mouth Yes Historical Provider, MD   dilTIAZem (CARDIZEM CD) 240 MG extended release capsule Take 1 capsule by mouth daily Yes Bonney Severs, DO   levothyroxine (SYNTHROID) 150 MCG tablet Take 1 tablet by mouth Daily Yes Bonney Severs, DO   losartan (COZAAR) 100 MG tablet Take 1 tablet by mouth daily Yes Bonney Severs, DO   omeprazole (PRILOSEC) 40 MG delayed release capsule Take 1 capsule by mouth daily Yes Bonney Severs, DO   tamsulosin (FLOMAX) 0.4 MG capsule  Yes Historical Provider, MD   fluocinonide (LIDEX) 0.05 % external solution APPLY TO BUMPS ON SCALP TWICE DAILY AS NEEDED Yes Historical Provider, MD   Multiple Vitamins-Minerals (CENTRUM SILVER ULTRA MENS) TABS Take 1 tablet by mouth daily Yes Historical Provider, MD       Allergies: Patient has no known allergies. Social History:   TOBACCO:   reports that he has never smoked. He has never used smokeless tobacco.  All smokers should join the free smoking cessation program and stop smoking before having surgery. ETOH:    reports no history of alcohol use. Problem Relation Age of Onset    Diabetes Mother     Hearing Loss Brother        Review of Systems:  Psychiatric:  depression and anxiety  Respiratory: negative  Cardiovascular: negative  Gastrointestinal: negative  Musculoskeletal:negative  All others reviewed, negative    Physical Exam:   VITALS: Blood pressure 130/68, pulse 64, temperature 97.3 °F (36.3 °C), resp. rate 18, height 5' 8\" (1.727 m), weight 191 lb (86.6 kg), SpO2 100 %. General appearance: alert, appears stated age and cooperative, does ambulate easily  Head: Normocephalic, without obvious abnormality, atraumatic  Eyes: PERRL  Ears/mouth/throat:  Ears clear, mouth normal, throat no redness  Neck: no adenopathy, no JVD, supple, symmetrical, trachea midline and thyroid not enlarged  Lungs: clear to auscultation bilaterally  Heart: regular rate and rhythm  Abdomen: soft, non-tender; bowel sounds normal; no masses,  no organomegaly  Extremities: extremities normal, atraumatic, no cyanosis or edema  Skin: no open wounds    ASSESSMENT:   gastric swelling seen on CT scan  Short of breath cutting grass or shoveling snow for the past few years. PLAN: EGD    Signed: Dr. Yesica Hobson M.D.     Send copy of consult to PCP, Alisha Marinelli DO

## 2022-03-09 NOTE — ANESTHESIA PRE PROCEDURE
Department of Anesthesiology  Preprocedure Note       Name:  Kelvin Sinclair   Age:  80 y.o.  :  1940                                          MRN:  [id number]         Date:  3/9/2022      Surgeon: Hortencia Winn):  Nneka Lara MD    Procedure: Procedure(s):  EGD BIOPSY    Medications prior to admission:   Prior to Admission medications    Medication Sig Start Date End Date Taking? Authorizing Provider   Glucosamine-Chondroit-Vit C-Mn (GLUCOSAMINE 1500 COMPLEX PO) Take by mouth    Historical Provider, MD   dilTIAZem (CARDIZEM CD) 240 MG extended release capsule Take 1 capsule by mouth daily 21   Simón Alvarado,    levothyroxine (SYNTHROID) 150 MCG tablet Take 1 tablet by mouth Daily 21   Simón Alvarado, DO   losartan (COZAAR) 100 MG tablet Take 1 tablet by mouth daily 21   Simón Alvarado, DO   omeprazole (PRILOSEC) 40 MG delayed release capsule Take 1 capsule by mouth daily 21   Simón Alvarado,    tamsulosin [facility]) 0.4 MG capsule  21   Historical Provider, MD   fluocinonide (LIDEX) 0.05 % external solution APPLY TO BUMPS ON SCALP TWICE DAILY AS NEEDED 6/3/21   Historical Provider, MD   Multiple Vitamins-Minerals (CENTRUM SILVER ULTRA MENS) TABS Take 1 tablet by mouth daily    Historical Provider, MD       Current medications:    No current facility-administered medications for this visit. No current outpatient medications on file.      Facility-Administered Medications Ordered in Other Visits   Medication Dose Route Frequency Provider Last Rate Last Admin    lactated ringers infusion   IntraVENous Continuous Nneka Lara  mL/hr at 22 1036 New Bag at 22 1036       Allergies:  No Known Allergies    Problem List:    Patient Active Problem List   Diagnosis Code    HTN (hypertension) I10    Over weight E66.3    PSVT (paroxysmal supraventricular tachycardia) (HCC) I47.1    PVC's (premature ventricular contractions) I49.3    Gastroesophageal reflux disease K21.9    SBO (small bowel obstruction) ([facility] [address] K56.609       Past Medical History:        Diagnosis Date    Allergic rhinitis     DUE TO POLLEN     Anxiety     Arthritis     Chronic kidney disease     COPD (chronic obstructive pulmonary disease) (HCC)     GERD (gastroesophageal reflux disease)     Hiatal hernia     History of cardiovascular stress test 3-18-13    nuclear exercise    Hyperlipidemia     Hypertension     Hypothyroidism     Nontoxic (diffuse) goiter     Psoriasis     Vascular dementia with depressed mood ([facility] [address] 2/21/2020    [facility]        Past Surgical History:        Procedure Laterality Date    CHOLECYSTECTOMY      COLONOSCOPY  12/13/2012    ECHO COMPL W DOP COLOR FLOW  3/18/2013         ENDOSCOPY, COLON, DIAGNOSTIC      EYE SURGERY      CATARACT    TONSILLECTOMY         Social History:    Social History     Tobacco Use    Smoking status: Never Smoker    Smokeless tobacco: Never Used   Substance Use Topics    Alcohol use: No     Comment: occasionally                                Counseling given: Not Answered      Vital Signs (Current): There were no vitals filed for this visit.                                            BP Readings from Last 3 Encounters:   03/09/22 130/68   03/09/22 122/67   03/04/22 (!) 150/81       NPO Status:                                                                                 BMI:   Wt Readings from Last 3 Encounters:   03/09/22 191 lb (86.6 kg)   03/04/22 189 lb (85.7 kg)   03/01/22 189 lb 8 oz (86 kg)     There is no height or weight on file to calculate BMI.    CBC:   Lab Results   Component Value Date    WBC 6.0 02/28/2022    RBC 4.47 02/28/2022    HGB 14.5 02/28/2022    HCT 44.6 02/28/2022    MCV 99.8 02/28/2022    RDW 13.1 02/28/2022     02/28/2022       CMP:   Lab Results   Component Value Date     03/01/2022    K 3.8 03/01/2022    K 3.9 02/26/2022     03/01/2022 CO2 25 03/01/2022    BUN 6 03/01/2022    CREATININE 0.8 03/01/2022    GFRAA >60 03/01/2022    LABGLOM >60 03/01/2022    GLUCOSE 125 03/01/2022    GLUCOSE 101 02/28/2012    PROT 6.1 02/28/2022    CALCIUM 8.6 03/01/2022    BILITOT 0.5 02/28/2022    ALKPHOS 64 02/28/2022    AST 17 02/28/2022    ALT 16 02/28/2022       POC Tests: No results for input(s): POCGLU, POCNA, POCK, POCCL, POCBUN, POCHEMO, POCHCT in the last 72 hours. Coags:   Lab Results   Component Value Date    PROTIME 13.1 02/28/2022    INR 1.1 02/28/2022       HCG (If Applicable): No results found for: PREGTESTUR, PREGSERUM, HCG, HCGQUANT     ABGs: No results found for: PHART, PO2ART, JRF6WZE, XIR3SOO, BEART, Q8EVLGEP     Type & Screen (If Applicable):  No results found for: LABABO, LABRH    Drug/Infectious Status (If Applicable):  No results found for: HIV, HEPCAB    COVID-19 Screening (If Applicable): No results found for: COVID19        Anesthesia Evaluation  Patient summary reviewed  Airway: Mallampati: I  TM distance: >3 FB   Neck ROM: full  Mouth opening: > = 3 FB Dental:          Pulmonary: breath sounds clear to auscultation  (+) COPD:                            ROS comment: Allergic Rhinitis. Cardiovascular:    (+) hypertension:, hyperlipidemia        Rhythm: regular  Rate: normal           Beta Blocker:  Not on Beta Blocker         Neuro/Psych:   (+) CVA:, psychiatric history:depression/anxiety             GI/Hepatic/Renal:   (+) hiatal hernia, GERD:, renal disease:,           Endo/Other:    (+) hypothyroidism: arthritis:., .                  ROS comment: Non Toxic Goiter. Psoriasis. Abdominal:             Vascular: negative vascular ROS. Other Findings:               Anesthesia Plan      MAC     ASA 4       Induction: intravenous. Anesthetic plan and risks discussed with patient. Plan discussed with CRNA.     Attending anesthesiologist reviewed and agrees with Manolo Leger MD 3/9/2022

## 2022-03-10 LAB — CLOTEST: NORMAL

## 2022-03-25 ENCOUNTER — OFFICE VISIT (OUTPATIENT)
Dept: SURGERY | Age: 82
End: 2022-03-25
Payer: MEDICARE

## 2022-03-25 VITALS
SYSTOLIC BLOOD PRESSURE: 168 MMHG | WEIGHT: 196 LBS | HEART RATE: 75 BPM | DIASTOLIC BLOOD PRESSURE: 79 MMHG | BODY MASS INDEX: 29.7 KG/M2 | TEMPERATURE: 97.2 F | HEIGHT: 68 IN

## 2022-03-25 DIAGNOSIS — I10 PRIMARY HYPERTENSION: ICD-10-CM

## 2022-03-25 DIAGNOSIS — K59.04 CHRONIC IDIOPATHIC CONSTIPATION: Primary | ICD-10-CM

## 2022-03-25 DIAGNOSIS — I49.3 PVC'S (PREMATURE VENTRICULAR CONTRACTIONS): ICD-10-CM

## 2022-03-25 DIAGNOSIS — I47.1 PSVT (PAROXYSMAL SUPRAVENTRICULAR TACHYCARDIA) (HCC): ICD-10-CM

## 2022-03-25 PROCEDURE — 99213 OFFICE O/P EST LOW 20 MIN: CPT | Performed by: SURGERY

## 2022-03-25 NOTE — PROGRESS NOTES
Jewels Contreras  3/25/2022      [facility] Office visit    HISTORY OF PRESENT ILLNESS:  Jewels Contreras is an 80 y.o. male, post EGD 3/9/2022 showed no gastric mass or ulcers, mild gastritis, biopsy negative for H.pylori, no duodenitis, mild esophagitis, 4 cm hiatal hernia with a moderate pouch of stomach above the diaphragm. Bowels have been moving better on Myralax daily. No nausea or abdominal pains. History:  in the hospital 2/27/22 with nausea and abdominal distension. Bowel moved before coming to the hospital. CT abdomen showed massively distended stomach with fluid in the entire small bowel and colon, read as possible obstruction or enteritis. NG tube placed with 700 cc returned, pain and nausea resolved completely. Past Medical History: He has a past medical history of Allergic rhinitis, Anxiety, Arthritis, Chronic kidney disease, COPD (chronic obstructive pulmonary disease) ([address] GERD (gastroesophageal reflux disease), Hiatal hernia, History of cardiovascular stress test, Hyperlipidemia, Hypertension, Hypothyroidism, Nontoxic (diffuse) goiter, Psoriasis, Vascular dementia with depressed mood ([address] and Vascular dementia with depressed mood ([address] Past Surgical History: He has a past surgical history that includes Cholecystectomy; Tonsillectomy; Endoscopy, colon, diagnostic; ECHO Compl W Dop Color Flow (3/18/2013); Colonoscopy (12/13/2012); eye surgery; and Upper gastrointestinal endoscopy (N/A, 3/9/2022). Home Medications  Prior to Visit Medications    Medication Sig Taking?  Authorizing Provider   omeprazole (PRILOSEC) 40 MG delayed release capsule Take 1 capsule by mouth daily  Jeffry Tucson, DO   losartan (COZAAR) 100 MG tablet Take 1 tablet by mouth daily  Jeffry Tucson, DO   levothyroxine (SYNTHROID) 150 MCG tablet Take 1 tablet by mouth Daily  Jeffry Tucson, DO   dilTIAZem (CARDIZEM CD) 240 MG extended release capsule Take 1 capsule by mouth daily  Jeffry Tucson, DO Glucosamine-Chondroit-Vit C-Mn (GLUCOSAMINE 1500 COMPLEX PO) Take by mouth  Historical Provider, MD   tamsulosin (FLOMAX) 0.4 MG capsule   Historical Provider, MD   fluocinonide (LIDEX) 0.05 % external solution APPLY TO BUMPS ON SCALP TWICE DAILY AS NEEDED  Historical Provider, MD   Multiple Vitamins-Minerals (CENTRUM SILVER ULTRA MENS) TABS Take 1 tablet by mouth daily  Historical Provider, MD       Allergies: Patient has no known allergies. Social History:   TOBACCO:   reports that he has never smoked. He has never used smokeless tobacco.  All smokers should join the free smoking cessation program and stop smoking before having surgery. ETOH:    reports no history of alcohol use. Problem Relation Age of Onset    Diabetes Mother     Hearing Loss Brother        Review of Systems:  Psychiatric:  depression and anxiety  Respiratory: negative  Cardiovascular: negative  Gastrointestinal: negative  Musculoskeletal:negative  All others reviewed, negative    Physical Exam:   VITALS: Blood pressure (!) 168/79, pulse 75, temperature 97.2 °F (36.2 °C), temperature source Temporal, height 5' 8\" (1.727 m), weight 196 lb (88.9 kg). General appearance: alert, appears stated age and cooperative, does ambulate easily  Head: Normocephalic, without obvious abnormality, atraumatic  Eyes: PERRL  Ears/mouth/throat:  Ears clear, mouth normal, throat no redness  Neck: no adenopathy, no JVD, supple, symmetrical, trachea midline and thyroid not enlarged  Lungs: clear to auscultation bilaterally  Heart: regular rate and rhythm  Abdomen: soft, non-tender; bowel sounds normal; no masses,  no organomegaly  Extremities: extremities normal, atraumatic, no cyanosis or edema  Skin: no open wounds    ASSESSMENT:   gastric swelling seen on CT scan, no gastric mass seen on EGD. Short of breath cutting grass or shoveling snow for the past few years. Colonoscopy by Dr. Chela Garrido normal last year.     PLAN:   Medical treatment of acid reflux as needed. Keep the bowels soft and moving daily with Myralax. Surgical repair of the hiatal hernia only if the symptoms become severe. Signed: Dr. Joy Moreland M.D.     Send copy of consult to PCP, Katherine Regan DO

## 2022-04-20 ENCOUNTER — OFFICE VISIT (OUTPATIENT)
Dept: CARDIOLOGY CLINIC | Age: 82
End: 2022-04-20
Payer: MEDICARE

## 2022-04-20 VITALS
HEIGHT: 68 IN | SYSTOLIC BLOOD PRESSURE: 126 MMHG | WEIGHT: 195 LBS | BODY MASS INDEX: 29.55 KG/M2 | RESPIRATION RATE: 16 BRPM | HEART RATE: 62 BPM | DIASTOLIC BLOOD PRESSURE: 68 MMHG

## 2022-04-20 DIAGNOSIS — I47.1 PSVT (PAROXYSMAL SUPRAVENTRICULAR TACHYCARDIA) (HCC): Primary | ICD-10-CM

## 2022-04-20 DIAGNOSIS — I20.8 OTHER FORMS OF ANGINA PECTORIS (HCC): ICD-10-CM

## 2022-04-20 DIAGNOSIS — I10 ESSENTIAL HYPERTENSION: ICD-10-CM

## 2022-04-20 DIAGNOSIS — E03.2 HYPOTHYROIDISM DUE TO MEDICATION: ICD-10-CM

## 2022-04-20 DIAGNOSIS — J41.8 MIXED SIMPLE AND MUCOPURULENT CHRONIC BRONCHITIS (HCC): ICD-10-CM

## 2022-04-20 DIAGNOSIS — K21.9 GASTROESOPHAGEAL REFLUX DISEASE, UNSPECIFIED WHETHER ESOPHAGITIS PRESENT: ICD-10-CM

## 2022-04-20 DIAGNOSIS — R06.09 DOE (DYSPNEA ON EXERTION): ICD-10-CM

## 2022-04-20 DIAGNOSIS — E78.5 DYSLIPIDEMIA: ICD-10-CM

## 2022-04-20 DIAGNOSIS — R55 SYNCOPE, UNSPECIFIED SYNCOPE TYPE: ICD-10-CM

## 2022-04-20 DIAGNOSIS — R53.83 OTHER FATIGUE: ICD-10-CM

## 2022-04-20 DIAGNOSIS — I10 HYPERTENSION, UNSPECIFIED TYPE: ICD-10-CM

## 2022-04-20 PROCEDURE — 99214 OFFICE O/P EST MOD 30 MIN: CPT | Performed by: INTERNAL MEDICINE

## 2022-04-20 PROCEDURE — 93000 ELECTROCARDIOGRAM COMPLETE: CPT | Performed by: INTERNAL MEDICINE

## 2022-04-20 NOTE — PROGRESS NOTES
Out Patient CARDIOLOGY FOLLOW UP    Name: Luisito Brochure    Age: 80 y.o. Date of Service: 4/20/2022      Referring Physician: No admitting provider for patient encounter. Chief Complaint   Patient presents with   [id number]         History of Present Illness: 66-year-old male with history of GERD, abdominal hernia, COPD, hypertension, hyperlipidemia, psoriasis, and vascular dementia as well as hypothyroidism who was recently hospitalized for small bowel obstruction and abdominal pain as well as syncope and cardiology was consulted. She reports she had EGD that showed some hernia but no significant bleeding. He present for follow-up visit today and reports he has been having dyspnea on exertion and fatigue with activities and required him to stop his activities when cutting glasses, going up on a hills and climbing steps. There is also vague chest discomfort as well. She has had no as make evaluation or echocardiogram for some time and last echocardiogram was 2013. In addition she had episode of syncope and she reports she has had 2 syncopes in his life. There has been episodes of palpitation and he was supposed to have a Zio patch heart monitor prior to recent discharge from the hospital but this was not done. Subsequently I have arranged for Zio patch heart monitor for palpitation and arrhythmia evaluation as well as arrange for echocardiogram and stress test for further evaluation. To follow-up in 3 months.           Review of Systems:   Cardiac: As per HPI  General: Denies fever or chills  Pulmonary: As per HPI  HEENT: Denies runny nose  GI: No complaints  : No complaints  Endocrine: Denies night sweats  Musculoskeletal: No complaints  Skin: Dry skin  Neuro: No complaints  Psych: Denies depression    Past Medical History:  Past Medical History:   Diagnosis Date    Allergic rhinitis     DUE TO POLLEN     Anxiety     Arthritis     Chronic kidney disease     COPD (chronic obstructive pulmonary disease) (HCC)     GERD (gastroesophageal reflux disease)     Hiatal hernia     History of cardiovascular stress test 3-18-13    nuclear exercise    Hyperlipidemia     Hypertension     Hypothyroidism     Nontoxic (diffuse) goiter     Psoriasis     Vascular dementia with depressed mood ([facility] 2/21/2020    Vascular dementia with depressed mood ([facility]      1. Generalized abdominal pain  Management per primary and GI  Report improvement and NG tube removed  Awaiting EGD  2. Small bowel obstruction (HCC)  Management per primary and GI  3. Syncope and collapse  Etiology likely vasovagal as wife report patient was on the toilet at the time this happened and in the same time he was having significant abdominal pain  Obtain updated echocardiogram to guide therapy  He has history of nonsustained SVT and occasional PVCs and will benefit from outpatient Zio patch heart monitor if no arrhythmias are found during hospitalization  4. Chest pain, unspecified type  Obtain echocardiogram to guide therapy  5. History of nonsustained SVT and occasional PVCs as well as right bundle branch block  Monitor on telemetry  5. COPD  6. GERD  7. Hiatal hernia  8. Hypertension  9. Hyperlipidemia  10. Hypothyroidism  11. Psoriasis  12. Vascular dementia     Past Surgical History:  Past Surgical History:   Procedure Laterality Date    CHOLECYSTECTOMY      COLONOSCOPY  12/13/2012    ECHO COMPL W DOP COLOR FLOW  3/18/2013         ENDOSCOPY, COLON, DIAGNOSTIC      EYE SURGERY      CATARACT    TONSILLECTOMY      UPPER GASTROINTESTINAL ENDOSCOPY N/A 3/9/2022    EGD BIOPSY performed by Mary Bajwa MD at [facility] ENDOSCOPY       Family History:  Family History   Problem Relation Age of Onset    Diabetes Mother     Hearing Loss Brother        Social History:  Social History     Socioeconomic History    Marital status:      Spouse name: Not on file    Number of children: Not on file    Years of education: Not on file    Highest education level: Not on file   Occupational History    Not on file   Tobacco Use    Smoking status: Never Smoker    Smokeless tobacco: Never Used   Vaping Use    Vaping Use: Never used   Substance and Sexual Activity    Alcohol use: No     Comment: occasionally    Drug use: No    Sexual activity: Not Currently   Other Topics Concern    Not on file   Social History Narrative    Not on file     Social Determinants of Health     Financial Resource Strain: Low Risk     Difficulty of Paying Living Expenses: Not hard at all   Food Insecurity: No Food Insecurity    Worried About Running Out of Food in the Last Year: Never true    Ora of Food in the Last Year: Never true   Transportation Needs:     Lack of Transportation (Medical): Not on file    Lack of Transportation (Non-Medical): Not on file   Physical Activity:     Days of Exercise per Week: Not on file    Minutes of Exercise per Session: Not on file   Stress:     Feeling of Stress : Not on file   Social Connections:     Frequency of Communication with Friends and Family: Not on file    Frequency of Social Gatherings with Friends and Family: Not on file    Attends Islam Services: Not on file    Active Member of [address] [place] [address] or Organizations: Not on file    Attends Club or Organization Meetings: Not on file    Marital Status: Not on file   Intimate Partner Violence:     Fear of Current or Ex-Partner: Not on file    Emotionally Abused: Not on file    Physically Abused: Not on file    Sexually Abused: Not on file   Housing Stability:     Unable to Pay for Housing in the Last Year: Not on file    Number of [place] in the Last Year: Not on file    Unstable Housing in the Last Year: Not on file       Allergies:  No Known Allergies    Home Medications:  Prior to Admission medications    Medication Sig Start Date End Date Taking?  Authorizing Provider   omeprazole (PRILOSEC) 40 MG delayed release capsule Take 1 capsule by mouth daily 3/18/22  Yes Minnie Betancourt,    losartan (COZAAR) 100 MG tablet Take 1 tablet by mouth daily 3/18/22  Yes Minnie Betancourt DO   levothyroxine (SYNTHROID) 150 MCG tablet Take 1 tablet by mouth Daily 3/18/22  Yes Minnie Betancourt DO   dilTIAZem (CARDIZEM CD) 240 MG extended release capsule Take 1 capsule by mouth daily 3/18/22  Yes Minnie Betancourt DO   Glucosamine-Chondroit-Vit C-Mn (GLUCOSAMINE 1500 COMPLEX PO) Take by mouth   Yes Historical Provider, MD   tamsulosin (FLOMAX) 0.4 MG capsule  9/7/21  Yes Historical Provider, MD   Multiple Vitamins-Minerals (CENTRUM SILVER ULTRA MENS) TABS Take 1 tablet by mouth daily   Yes Historical Provider, MD   fluocinonide (LIDEX) 0.05 % external solution APPLY TO BUMPS ON SCALP TWICE DAILY AS NEEDED  Patient not taking: Reported on 4/20/2022 6/3/21   Historical Provider, MD       Current Medications:  Current Outpatient Medications   Medication Sig Dispense Refill    omeprazole (PRILOSEC) 40 MG delayed release capsule Take 1 capsule by mouth daily 90 capsule 0    losartan (COZAAR) 100 MG tablet Take 1 tablet by mouth daily 90 tablet 0    levothyroxine (SYNTHROID) 150 MCG tablet Take 1 tablet by mouth Daily 90 tablet 0    dilTIAZem (CARDIZEM CD) 240 MG extended release capsule Take 1 capsule by mouth daily 90 capsule 0    Glucosamine-Chondroit-Vit C-Mn (GLUCOSAMINE 1500 COMPLEX PO) Take by mouth      tamsulosin (FLOMAX) 0.4 MG capsule       Multiple Vitamins-Minerals (CENTRUM SILVER ULTRA MENS) TABS Take 1 tablet by mouth daily      fluocinonide (LIDEX) 0.05 % external solution APPLY TO BUMPS ON SCALP TWICE DAILY AS NEEDED (Patient not taking: Reported on 4/20/2022)       Current Facility-Administered Medications   Medication Dose Route Frequency Provider Last Rate Last Admin    regadenoson (LEXISCAN) injection 0.4 mg  0.4 mg IntraVENous ONCE PRN Bashir Naranjo MD        perflutren lipid microspheres (DEFINITY) injection 1.65 mg  1.5 mL IntraVENous ONCE PRN Rukhsana Landeros MD             Physical Exam:  /68   Pulse 62   Resp 16   Ht 5' 8\" (1.727 m)   Wt 195 lb (88.5 kg)   BMI 29.65 kg/m²   Wt Readings from Last 3 Encounters:   04/20/22 195 lb (88.5 kg)   03/25/22 196 lb (88.9 kg)   03/18/22 196 lb 6.4 oz (89.1 kg)       Appearance: Alert and oriented x3 not in acute distress. Skin: Dry skin  Head: Atraumatic  Eyes: Intact extraocular muscles   ENMT: Mucous membranes are moist  Neck: Supple  Lungs: Clear to auscultation  Cardiac: Normal S1 and S2  Abdomen: Protuberant  Extremities: Intact range of motion  Neurologic: No focal neurological deficits  Peripheral Pulses: 2+ peripheral pulses    Intake/Output:  No intake or output data in the 24 hours ending 04/20/22 0947  [unfilled]    Laboratory Tests:  No results for input(s): NA, K, CL, CO2, BUN, CREATININE, GLUCOSE, CALCIUM in the last 72 hours. Lab Results   Component Value Date    MG 2.0 02/27/2022    MG 2.0 03/18/2013    MG 2.2 07/30/2012     No results for input(s): ALKPHOS, ALT, AST, PROT, BILITOT, BILIDIR, LABALBU in the last 72 hours. No results for input(s): WBC, RBC, HGB, HCT, MCV, MCH, MCHC, RDW, PLT, MPV in the last 72 hours. Lab Results   Component Value Date    CKTOTAL 84 03/18/2013    CKMB 0.8 03/18/2013    TROPONINI <0.01 03/18/2013    TROPONINI <0.01 03/17/2013     No results for input(s): CKTOTAL, CKMB, CKMBINDEX, TROPHS in the last 72 hours.   Lab Results   Component Value Date    INR 1.1 02/28/2022    PROTIME 13.1 (H) 02/28/2022     Lab Results   Component Value Date    TSH 1.540 02/27/2022    TSH 2.110 11/22/2021    TSH 1.610 11/11/2020     Lab Results   Component Value Date    LABA1C 5.4 11/22/2021    LABA1C 5.6 11/11/2020    LABA1C 5.6 09/12/2019     No results found for: EAG  Lab Results   Component Value Date    CHOL 190 11/22/2021    CHOL 176 11/11/2020    CHOL 194 09/12/2019     Lab Results   Component Value Date    TRIG 107 11/22/2021    TRIG 105 Imaging; Future  - Cardiac Stress Test - w/Pharm; Future  - ECHO COMPLETE; Future    4. Other forms of angina pectoris [facility])  - Cardiac event monitor; Future  - NM Cardiac Stress Test Nuclear Imaging; Future  - Cardiac Stress Test - w/Pharm; Future  - ECHO COMPLETE; Future    5. Syncope, unspecified syncope type  - Cardiac event monitor; Future  - NM Cardiac Stress Test Nuclear Imaging; Future  - Cardiac Stress Test - w/Pharm; Future  - ECHO COMPLETE; Future    6. Hypertension, unspecified type  Blood pressure is stable    7. Gastroesophageal reflux disease, unspecified whether esophagitis present  Follow-up with your primary care physician and GI  8. Mixed simple and mucopurulent chronic bronchitis ([address]  Follow-up with your PCP  9. Dyslipidemia  Follow-up with your primary care physician for routine blood work and lipid profile and if abnormal lipid profile is noted to initiate statin therapy    10. Essential hypertension  Blood pressure is stable  11. Hypothyroidism due to medication  Follow-up with your PCP    FOLLOW-UP in 3 months      Thank you for allowing me to participate in your patient's care. Please feel free to contact me if you have any questions or concerns.     Dayne Austin MD  [facility]) Cardiology

## 2022-05-03 ENCOUNTER — TELEPHONE (OUTPATIENT)
Dept: CARDIOLOGY | Age: 82
End: 2022-05-03

## 2022-05-03 NOTE — TELEPHONE ENCOUNTER
Left a message on patients home phone reminder of his appointment on 5/5/22 at 0730 am for a pharmacological stress test instructions and COVID checklist left on home phone asked to call back with any questions.

## 2022-05-05 ENCOUNTER — HOSPITAL ENCOUNTER (OUTPATIENT)
Dept: CARDIOLOGY | Age: 82
Discharge: HOME | End: 2022-05-05
Payer: MEDICARE

## 2022-05-05 VITALS
BODY MASS INDEX: 29.55 KG/M2 | DIASTOLIC BLOOD PRESSURE: 85 MMHG | WEIGHT: 195 LBS | HEART RATE: 57 BPM | SYSTOLIC BLOOD PRESSURE: 132 MMHG | HEIGHT: 68 IN

## 2022-05-05 DIAGNOSIS — R06.09 DOE (DYSPNEA ON EXERTION): ICD-10-CM

## 2022-05-05 DIAGNOSIS — I47.1 PSVT (PAROXYSMAL SUPRAVENTRICULAR TACHYCARDIA) (HCC): ICD-10-CM

## 2022-05-05 DIAGNOSIS — R53.83 OTHER FATIGUE: ICD-10-CM

## 2022-05-05 DIAGNOSIS — R55 SYNCOPE, UNSPECIFIED SYNCOPE TYPE: ICD-10-CM

## 2022-05-05 DIAGNOSIS — I20.8 OTHER FORMS OF ANGINA PECTORIS (HCC): ICD-10-CM

## 2022-05-05 PROCEDURE — 2580000003 HC RX 258: Performed by: INTERNAL MEDICINE

## 2022-05-05 PROCEDURE — 93017 CV STRESS TEST TRACING ONLY: CPT

## 2022-05-05 PROCEDURE — 6360000002 HC RX W HCPCS: Performed by: INTERNAL MEDICINE

## 2022-05-05 PROCEDURE — A9502 TC99M TETROFOSMIN: HCPCS | Performed by: INTERNAL MEDICINE

## 2022-05-05 PROCEDURE — 3430000000 HC RX DIAGNOSTIC RADIOPHARMACEUTICAL: Performed by: INTERNAL MEDICINE

## 2022-05-05 PROCEDURE — 78452 HT MUSCLE IMAGE SPECT MULT: CPT

## 2022-05-05 RX ORDER — SODIUM CHLORIDE 0.9 % (FLUSH) 0.9 %
10 SYRINGE (ML) INJECTION PRN
Status: DISCONTINUED | OUTPATIENT
Start: 2022-05-05 | End: 2022-05-06 | Stop reason: HOSPADM

## 2022-05-05 RX ORDER — SODIUM CHLORIDE 9 MG/ML
INJECTION, SOLUTION INTRAVENOUS CONTINUOUS
Status: DISCONTINUED | OUTPATIENT
Start: 2022-05-05 | End: 2022-05-06 | Stop reason: HOSPADM

## 2022-05-05 RX ADMIN — SODIUM CHLORIDE, PRESERVATIVE FREE 10 ML: 5 INJECTION INTRAVENOUS at 07:33

## 2022-05-05 RX ADMIN — TETROFOSMIN 11 MILLICURIE: 0.23 INJECTION, POWDER, LYOPHILIZED, FOR SOLUTION INTRAVENOUS at 07:33

## 2022-05-05 RX ADMIN — REGADENOSON 0.4 MG: 0.08 INJECTION, SOLUTION INTRAVENOUS at 08:57

## 2022-05-05 RX ADMIN — SODIUM CHLORIDE, PRESERVATIVE FREE 10 ML: 5 INJECTION INTRAVENOUS at 08:58

## 2022-05-05 RX ADMIN — TETROFOSMIN 32.4 MILLICURIE: 0.23 INJECTION, POWDER, LYOPHILIZED, FOR SOLUTION INTRAVENOUS at 09:11

## 2022-05-05 RX ADMIN — SODIUM CHLORIDE: 9 INJECTION, SOLUTION INTRAVENOUS at 08:55

## 2022-05-05 NOTE — PROCEDURES
[address] [facility]. [address], [facility]  [phone]                 Pharmacologic Stress Nuclear Gated SPECT Study    [facility]    :  1940          Sex: male         Date of Study:  2022    Height: 5' 8\" (172.7 cm)         Weight: 195 lb (88.5 kg)     Ordering Provider: Nyasia Palomino MD        PCP: William Miranda DO      Cardiologist: Cecil Greene             Interpreting Physician: Jeffery Garrido  _________________________________________________________________________________    Indication:   Diagnosis of coronary disease    Clinical History:   Patient has no known history of coronary artery disease. Resting ECG:    HR 57 bpm  Sinus bradycardia with left axis deviation and delayed precordial transition zone    Procedure:   Prior to the performance of the stress portion of the procedure, the patient's intravenous access infiltrated and upon attempting to replace his intravenous catheter in the face of a history of vasodepressor mediated syncope the patient developed near syncope with associated pallor and diaphoresis associated with relative hypotension and bradycardia resolved by transition to the supine position and transient intravenous fluid administration with a return to baseline vital signs permitting completion of the procedure. Pharmacologic stress testing was performed with regadenoson 0.4 mg for 15 seconds. The heart rate was 57 at baseline and marco a to 64 beats during the infusion. The blood pressure at baseline was 132/85 and blood pressure at the end of infusion was 110/60. Blood pressure response was normal during the stress procedure. The patient tolerated the infusion well. ECG during the infusion did not change.     IMAGING: Myocardial perfusion imaging was performed at rest 30-35 minutes following the intravenous injection of 11.0 mCi of (Tc-tetrofosmin) followed by 10 ml of Normal Saline. As per infusion protocol, the patient was injected intravenously with 32.4 mCi of (Tc-tetrofosmin) followed by 10 ml of Normal Saline. Gated post-stress tomographic imaging was performed 45 minutes after stress. FINDINGS: The overall quality of the study was good. Left ventricular cavity size was noted to be enlarged on both rest and stress studies. Rotational analog analysis demonstrated no evidence of motion artifact with mild diaphragmatic attenuation. The gated SPECT stress imaging in the short, vertical long, and horizontal long axis demonstrated normal homogeneous tracer distribution throughout the myocardium both on post regadenoson and resting images. Gated SPECT left ventricular ejection fraction was calculated to be 79%, with normal myocardial thickening and wall motion. Impression:    1. Electrocardiographically normal regadenoson infusion with a clinically  non-ischemic response  2. Myocardial perfusion imaging was normal.    3. Overall left ventricular systolic function was normal without regional wall motion abnormalities. 4. Low risk pharmacologic stress test.    Thank you for sending your patient to this [employer].      Electronically signed by Linsey Norwood MD on 5/5/22 at 12:46 PM EDT

## 2022-05-09 ENCOUNTER — TELEPHONE (OUTPATIENT)
Dept: CARDIOLOGY CLINIC | Age: 82
End: 2022-05-09

## 2022-05-09 NOTE — TELEPHONE ENCOUNTER
Contacted patient's wife with stress results per Dr. Norma Pruitt. She verbalized understanding and will inform patient. Patient's echo is scheduled for 5/10/2022 at the [facility] office. ----- Message from Mervin Cerrato MD sent at 5/6/2022  4:42 PM EDT -----  Stress test was fine. Details will be discussed during follow-up visit.

## 2022-05-10 ENCOUNTER — HOSPITAL ENCOUNTER (OUTPATIENT)
Dept: CARDIOLOGY | Age: 82
Discharge: HOME | End: 2022-05-10

## 2022-05-10 DIAGNOSIS — I20.8 OTHER FORMS OF ANGINA PECTORIS (HCC): ICD-10-CM

## 2022-05-10 DIAGNOSIS — R06.09 DOE (DYSPNEA ON EXERTION): ICD-10-CM

## 2022-05-10 DIAGNOSIS — R55 SYNCOPE, UNSPECIFIED SYNCOPE TYPE: ICD-10-CM

## 2022-05-10 DIAGNOSIS — R53.83 OTHER FATIGUE: ICD-10-CM

## 2022-05-10 DIAGNOSIS — I47.1 PSVT (PAROXYSMAL SUPRAVENTRICULAR TACHYCARDIA) (HCC): ICD-10-CM

## 2022-05-16 DIAGNOSIS — I47.1 PSVT (PAROXYSMAL SUPRAVENTRICULAR TACHYCARDIA) (HCC): ICD-10-CM

## 2022-05-16 DIAGNOSIS — R06.09 DOE (DYSPNEA ON EXERTION): ICD-10-CM

## 2022-05-16 DIAGNOSIS — R53.83 OTHER FATIGUE: ICD-10-CM

## 2022-05-16 DIAGNOSIS — I20.8 OTHER FORMS OF ANGINA PECTORIS (HCC): ICD-10-CM

## 2022-05-16 DIAGNOSIS — R55 SYNCOPE, UNSPECIFIED SYNCOPE TYPE: ICD-10-CM

## 2022-05-17 ENCOUNTER — TELEPHONE (OUTPATIENT)
Dept: CARDIOLOGY CLINIC | Age: 82
End: 2022-05-17

## 2022-05-19 NOTE — RESULT ENCOUNTER NOTE
Please notify pt that there was No dangerous abnormal heart rhythm or arrhythmias noted. Details of heart monitor will be discussed during follow up visit. Call if there are symptoms for earlier visit.

## 2022-05-19 NOTE — TELEPHONE ENCOUNTER
Left message for patient to contact office. ----- Message from Paul Montoya MD sent at 5/19/2022 11:37 AM EDT -----  Please notify pt that there was No dangerous abnormal heart rhythm or arrhythmias noted. Details of heart monitor will be discussed during follow up visit. Call if there are symptoms for earlier visit.

## 2022-05-20 NOTE — TELEPHONE ENCOUNTER
Patient's wife returned our call and was given monitor results per Dr. Madeline Herzog. She verbalized understanding. Patient and wife would like echo results of 5/10/2022. Please advise. n/a

## 2022-06-16 NOTE — TELEPHONE ENCOUNTER
Contacted patient's wife with echo results per Dr. Madeline Herzog. She verbalized understanding.    ----- Message from Gomez Celestin MD sent at 6/16/2022  4:20 PM EDT -----  Heart is pumping fine. Details will be discussed during follow-up visit.

## 2022-07-18 ENCOUNTER — OFFICE VISIT (OUTPATIENT)
Dept: CARDIOLOGY CLINIC | Age: 82
End: 2022-07-18
Payer: MEDICARE

## 2022-07-18 VITALS
HEIGHT: 68 IN | WEIGHT: 197 LBS | DIASTOLIC BLOOD PRESSURE: 70 MMHG | SYSTOLIC BLOOD PRESSURE: 124 MMHG | RESPIRATION RATE: 16 BRPM | BODY MASS INDEX: 29.86 KG/M2 | HEART RATE: 62 BPM

## 2022-07-18 DIAGNOSIS — I10 PRIMARY HYPERTENSION: ICD-10-CM

## 2022-07-18 DIAGNOSIS — I47.1 PSVT (PAROXYSMAL SUPRAVENTRICULAR TACHYCARDIA) (HCC): Primary | ICD-10-CM

## 2022-07-18 DIAGNOSIS — R55 VASOVAGAL SYNCOPE: ICD-10-CM

## 2022-07-18 PROCEDURE — 93000 ELECTROCARDIOGRAM COMPLETE: CPT | Performed by: INTERNAL MEDICINE

## 2022-07-18 PROCEDURE — 1123F ACP DISCUSS/DSCN MKR DOCD: CPT | Performed by: CLINICAL NURSE SPECIALIST

## 2022-07-18 PROCEDURE — 99212 OFFICE O/P EST SF 10 MIN: CPT | Performed by: CLINICAL NURSE SPECIALIST

## 2022-07-24 NOTE — PROGRESS NOTES
OUTPATIENT CARDIOLOGY FOLLOW-UP    Name: Neal Canada    Age: 80 y.o. Primary Care Physician: Rosibel Ruby DO    Date of Service: 7/18/2022    Chief Complaint: Syncope     Interim History:   Mr. Geena Mayer is an 80year old gentleman who is being seen today in routine follow up of his hypertension and syncope, and for review of recent diagnostic tests. He is followed here by Dr. Vilma Montoya and was last seen in April 2022, at which time echocardiogram, stress test, and Zio patch monitor was ordered (results detailed below). Since his last visit here, he has had one syncopal event which occurred after he had been mowing his lawn in the hot weather. He had came inside, complained of feeling lightheaded and then lost consciousness. According to his wife, he was diaphoretic and pale, but quickly regained consciousness; there was no disorientation or loss of bowel or bladder control. He remains active, and denies exertional chest discomfort or dyspnea with such activities as mowing his lawn with  a self propelled mower. Review of Systems:   Cardiac: No chest pain, palpitations, or peripheral edema. General: No fever, chills, fatigue, or significant weight changes. Pulmonary: No dyspnea, orthopnea, or PND. HEENT: No visual disturbances, difficult swallowing, or epistaxis  GI: No nausea, vomiting, abdominal pain   : No dysuria, hematuria  Endocrine: No temperature intolerance or excessive thirst.   Musculoskeletal: No myalgias or arthralgias   Skin: No rash or ulcerations. Neuro: Syncope as above.    Psych: Currently with no depression, anxiety    Past Medical History:  Past Medical History:   Diagnosis Date    Allergic rhinitis     DUE TO POLLEN     Anxiety     Arthritis     Chronic kidney disease     COPD (chronic obstructive pulmonary disease) (HCC)     GERD (gastroesophageal reflux disease)     Hiatal hernia     History of cardiovascular stress test 3-18-13    nuclear exercise    Hyperlipidemia Hypertension     Hypothyroidism     Nontoxic (diffuse) goiter     Psoriasis     Vascular dementia with depressed mood ([address] 2/21/2020    Vascular dementia with depressed mood (HCC)        Past Surgical History:  Past Surgical History:   Procedure Laterality Date    CHOLECYSTECTOMY      COLONOSCOPY  12/13/2012    ECHO COMPL W DOP COLOR FLOW  3/18/2013         ENDOSCOPY, COLON, DIAGNOSTIC      EYE SURGERY      CATARACT    TONSILLECTOMY      UPPER GASTROINTESTINAL ENDOSCOPY N/A 3/9/2022    EGD BIOPSY performed by Argenis Solis MD at [facility] ENDOSCOPY       Family History:  Family History   Problem Relation Age of Onset    Diabetes Mother     Hearing Loss Brother        Social History:  Social History     Socioeconomic History    Marital status:      Spouse name: Not on file    Number of children: Not on file    Years of education: Not on file    Highest education level: Not on file   Occupational History    Not on file   Tobacco Use    Smoking status: Never    Smokeless tobacco: Never   Vaping Use    Vaping Use: Never used   Substance and Sexual Activity    Alcohol use: Yes     Comment: occasionally    Drug use: No    Sexual activity: Not Currently   Other Topics Concern    Not on file   Social History Narrative    Not on file     Social Determinants of Health     Financial Resource Strain: Low Risk     Difficulty of Paying Living Expenses: Not hard at all   Food Insecurity: No Food Insecurity    Worried About Running Out of Food in the Last Year: Never true    Ran Out of Food in the Last Year: Never true   Transportation Needs: Not on file   Physical Activity: Not on file   Stress: Not on file   Social Connections: Not on file   Intimate Partner Violence: Not on file   Housing Stability: Not on file       Allergies:  No Known Allergies    Current Medications:  Current Outpatient Medications   Medication Sig Dispense Refill    Multiple Vitamins-Minerals (HAIR SKIN AND NAILS FORMULA PO) Take by mouth levothyroxine (SYNTHROID) 150 MCG tablet Take 1 tablet by mouth Daily 90 tablet 0    omeprazole (PRILOSEC) 40 MG delayed release capsule Take 1 capsule by mouth daily 90 capsule 0    losartan (COZAAR) 100 MG tablet Take 1 tablet by mouth daily 90 tablet 0    dilTIAZem (CARDIZEM CD) 240 MG extended release capsule Take 1 capsule by mouth daily 90 capsule 0    Glucosamine-Chondroit-Vit C-Mn (GLUCOSAMINE 1500 COMPLEX PO) Take by mouth      tamsulosin (FLOMAX) 0.4 MG capsule       fluocinonide (LIDEX) 0.05 % external solution APPLY TO BUMPS ON SCALP TWICE DAILY AS NEEDED      Multiple Vitamins-Minerals (CENTRUM SILVER ULTRA MENS) TABS Take 1 tablet by mouth daily       Current Facility-Administered Medications   Medication Dose Route Frequency Provider Last Rate Last Admin    regadenoson (LEXISCAN) injection 0.4 mg  0.4 mg IntraVENous ONCE PRN Bashir Naranjo MD        perflutren lipid microspheres (DEFINITY) injection 1.65 mg  1.5 mL IntraVENous ONCE PRN Bashir Naranjo MD           Physical Exam:  /70   Pulse 62   Resp 16   Ht 5' 8\" (1.727 m)   Wt 197 lb (89.4 kg)   BMI 29.95 kg/m²   Wt Readings from Last 3 Encounters:   07/18/22 197 lb (89.4 kg)   05/18/22 197 lb 12.8 oz (89.7 kg)   05/05/22 195 lb (88.5 kg)     Appearance: Awake, alert and oriented x 3, no apparent acute distress  Skin: Warm and dry   Head: Normocephalic, atraumatic  Eyes: EOMI, no conjunctival erythema  ENMT: No pharyngeal erythema, MMM, no rhinorrhea  Neck: Supple, no elevated JVP or carotid bruits  Lungs: Clear to auscultation bilaterally. No wheezes, rales, or rhonchi.   Cardiac: Regular rate and rhythm, +S1S2, no murmurs apparent  Abdomen: Soft, nontender, +bowel sounds  Extremities: Moves all extremities x 4, no lower extremity edema  Neurologic: No focal motor deficits apparent, normal mood and affect, alert and oriented x 3  Peripheral Pulses: Intact posterior tibial pulses bilaterally        Cardiac Tests:  EKG today showed Sinus  Rhythm, First degree A-V block  - occasional ectopic ventricular beat. Nas = 226. -Left axis -anterior fascicular block. -Decreasing R-wave progression -may be secondary to pulmonary disease  consider old anterior infarct. Low voltage with rightward P-axis and rotation -possible pulmonary disease. (To be reviewed by Dr. Jakob Vann)    Echocardiogram 5/10/2022 (Dr. Jakob Vann):   Summary   Ejection fraction is visually estimated at 60 to 65%. Normal right ventricular size and function. Mild mitral regurgitation is present. The aortic valve is trileaflet. Aortic valve opens well. Mild aortic regurgitation is noted. Mild tricuspid regurgitation. The Mid Ascending aortic root 4.2cm    Lexiscan stress test 5/05/2022:   Impression:    Electrocardiographically normal regadenoson infusion with a clinically  non-ischemic response  Myocardial perfusion imaging was normal.    Overall left ventricular systolic function was normal without regional wall motion abnormalities. 4. Low risk pharmacologic stress test.    Zio monitor 4/20/2022 to 5/04/2022:      Echocardiogram 3/2013:    Summary    Left ventricular size and wall motion normal, EF 65%. Mild LVH. Stage I diastolic dysfunction. There is trace to mild tricuspid regurgitation. No evidence of pericardial effusion. Lexiscan stress 3/2013:   1. Myocardial perfusion SPECT, within normal limits. 2. Stress induced ischemia is not demonstrated. 3. EF 66%        Assessment:   Recurrent syncope  Prodrome and circumstances consistent with vasovagal syncope  2. PSVT  3. NSVT (one five beat episode) on recent ambulatory monitor   4. Hypertension: stable  5. Dyslipidemia  6. Hypothyroidism: on replacement therapy   7.  BPH: on Flomax     Treatment Plan:   He was encouraged to remain active   He was advised to stay well hydrated with non caffeine containing beverages, change positions gradually (including dangling legs before getting out of bed), avoid prolonged exposure to heat and prolonged standing in one place (and to perform isometric exercises if he does). He was advised to discuss alternative medication to Flomax with his PCP  Will plan for EP referral for recurrent/worsening symptoms. Echocardiogram ~ one  year to follow aortic root dilation   Tentative follow up with Dr. Rashi Al in one year or as needed. The patient's current medication list, allergies, problem list and results of all previously ordered testing were reviewed at today's visit.     ENRICO An-CNS  [facility]) Cardiology

## 2022-12-16 DIAGNOSIS — E03.9 HYPOTHYROIDISM, UNSPECIFIED TYPE: ICD-10-CM

## 2022-12-16 DIAGNOSIS — Z00.00 WELL ADULT EXAM: ICD-10-CM

## 2022-12-16 DIAGNOSIS — E78.01 FAMILIAL HYPERCHOLESTEROLEMIA: ICD-10-CM

## 2022-12-16 DIAGNOSIS — E55.9 VITAMIN D DEFICIENCY: ICD-10-CM

## 2022-12-16 DIAGNOSIS — D50.0 IRON DEFICIENCY ANEMIA SECONDARY TO BLOOD LOSS (CHRONIC): ICD-10-CM

## 2022-12-16 DIAGNOSIS — R73.9 HYPERGLYCEMIA: ICD-10-CM

## 2022-12-16 LAB
ALBUMIN SERPL-MCNC: 4.4 G/DL (ref 3.5–5.2)
ALP BLD-CCNC: 81 U/L (ref 40–129)
ALT SERPL-CCNC: 12 U/L (ref 0–40)
ANION GAP SERPL CALCULATED.3IONS-SCNC: 10 MMOL/L (ref 7–16)
AST SERPL-CCNC: 13 U/L (ref 0–39)
BILIRUB SERPL-MCNC: 0.5 MG/DL (ref 0–1.2)
BUN BLDV-MCNC: 10 MG/DL (ref 6–23)
CALCIUM SERPL-MCNC: 9.3 MG/DL (ref 8.6–10.2)
CHLORIDE BLD-SCNC: 103 MMOL/L (ref 98–107)
CHOLESTEROL, TOTAL: 173 MG/DL (ref 0–199)
CO2: 27 MMOL/L (ref 22–29)
CREAT SERPL-MCNC: 0.9 MG/DL (ref 0.7–1.2)
GFR SERPL CREATININE-BSD FRML MDRD: >60 ML/MIN/1.73
GLUCOSE BLD-MCNC: 101 MG/DL (ref 74–99)
HBA1C MFR BLD: 5.6 % (ref 4–5.6)
HCT VFR BLD CALC: 47.9 % (ref 37–54)
HDLC SERPL-MCNC: 57 MG/DL
HEMOGLOBIN: 16.2 G/DL (ref 12.5–16.5)
LDL CHOLESTEROL CALCULATED: 101 MG/DL (ref 0–99)
MCH RBC QN AUTO: 32.7 PG (ref 26–35)
MCHC RBC AUTO-ENTMCNC: 33.8 % (ref 32–34.5)
MCV RBC AUTO: 96.6 FL (ref 80–99.9)
PDW BLD-RTO: 12.9 FL (ref 11.5–15)
PLATELET # BLD: 182 E9/L (ref 130–450)
PMV BLD AUTO: 10.1 FL (ref 7–12)
POTASSIUM SERPL-SCNC: 4.1 MMOL/L (ref 3.5–5)
RBC # BLD: 4.96 E12/L (ref 3.8–5.8)
SODIUM BLD-SCNC: 140 MMOL/L (ref 132–146)
TOTAL PROTEIN: 7 G/DL (ref 6.4–8.3)
TRIGL SERPL-MCNC: 77 MG/DL (ref 0–149)
TSH SERPL DL<=0.05 MIU/L-ACNC: 1.45 UIU/ML (ref 0.27–4.2)
VITAMIN D 25-HYDROXY: 42 NG/ML (ref 30–100)
VLDLC SERPL CALC-MCNC: 15 MG/DL
WBC # BLD: 5 E9/L (ref 4.5–11.5)

## 2023-03-22 PROBLEM — I20.8 OTHER FORMS OF ANGINA PECTORIS (HCC): Status: ACTIVE | Noted: 2023-03-22

## 2023-03-22 PROBLEM — I20.89 OTHER FORMS OF ANGINA PECTORIS: Status: ACTIVE | Noted: 2023-03-22

## 2023-03-22 PROBLEM — J41.8 MIXED SIMPLE AND MUCOPURULENT CHRONIC BRONCHITIS (HCC): Status: ACTIVE | Noted: 2023-03-22

## 2023-06-21 ENCOUNTER — TELEPHONE (OUTPATIENT)
Dept: CARDIOLOGY CLINIC | Age: 83
End: 2023-06-21

## 2023-06-21 NOTE — TELEPHONE ENCOUNTER
Called patient to schedule annual appointment. Patient declined to schedule stating he is feeling well and does not feel he needs to follow up with cardiology.

## 2024-02-23 PROBLEM — G30.0 DEMENTIA IN ALZHEIMER'S DISEASE WITH EARLY ONSET (HCC): Status: ACTIVE | Noted: 2024-02-23

## 2024-02-23 PROBLEM — F02.80 DEMENTIA IN ALZHEIMER'S DISEASE WITH EARLY ONSET (HCC): Status: ACTIVE | Noted: 2024-02-23

## 2024-02-23 PROBLEM — I47.20 VENTRICULAR TACHYCARDIA (HCC): Status: ACTIVE | Noted: 2024-02-23

## 2024-02-23 PROBLEM — I73.9 PVD (PERIPHERAL VASCULAR DISEASE): Status: ACTIVE | Noted: 2024-02-23

## (undated) DEVICE — KIT BEDSIDE REVITAL OX 500ML

## (undated) DEVICE — FORCEPS BX L240CM JAW DIA2.8MM L CAP W/ NDL MIC MESH TOOTH

## (undated) DEVICE — MEDI-VAC YANKAUER SUCTION HANDLE W/BULBOUS TIP: Brand: CARDINAL HEALTH

## (undated) DEVICE — SPONGE GZ 4IN 4IN 4 PLY N WVN AVANT

## (undated) DEVICE — MASK,FACE,MAXFLUIDPROTECT,SHIELD/ERLPS: Brand: MEDLINE

## (undated) DEVICE — VALVE SUCTION AIR H2O HYDR H2O JET CONN STRL ORCA POD + DISP

## (undated) DEVICE — LUBRICANT SURG JELLY ST BACTER TUBE 4.25OZ

## (undated) DEVICE — KENDALL 450 SERIES MONITORING FOAM ELECTRODE - RECTANGULAR SHAPE ( 3/PK): Brand: KENDALL

## (undated) DEVICE — BLOCK BITE 60FR CAREGUARD

## (undated) DEVICE — MEDI-VAC NON-CONDUCTIVE SUCTION TUBING: Brand: CARDINAL HEALTH

## (undated) DEVICE — CONTAINER SPEC COLL 960ML POLYPR TRIANG GRAD INTAKE/OUTPUT

## (undated) DEVICE — 6 X 9  1.75MIL 4-WALL LABGUARD: Brand: MINIGRIP COMMERCIAL LLC

## (undated) DEVICE — GOWN ISOLATN REG YEL M WT MULTIPLY SIDETIE LEV 2